# Patient Record
Sex: FEMALE | Race: WHITE | NOT HISPANIC OR LATINO | URBAN - METROPOLITAN AREA
[De-identification: names, ages, dates, MRNs, and addresses within clinical notes are randomized per-mention and may not be internally consistent; named-entity substitution may affect disease eponyms.]

---

## 2023-08-03 ENCOUNTER — HOSPITAL ENCOUNTER (OUTPATIENT)
Dept: DATA CONVERSION | Facility: HOSPITAL | Age: 43
End: 2023-08-03

## 2023-09-07 ENCOUNTER — HOSPITAL ENCOUNTER (OUTPATIENT)
Dept: DATA CONVERSION | Facility: HOSPITAL | Age: 43
Discharge: HOME | End: 2023-09-07
Payer: COMMERCIAL

## 2023-09-07 DIAGNOSIS — R20.2 PARESTHESIA OF SKIN: ICD-10-CM

## 2023-09-07 LAB
ALBUMIN SERPL ELPH-MCNC: NORMAL G/DL
ALBUMIN SERPL-MCNC: 4.5 GM/DL (ref 3.5–5)
ALBUMIN/GLOB SERPL: 1.6 RATIO (ref 1.5–3)
ALP BLD-CCNC: 46 U/L (ref 35–125)
ALPHA1 GLOB SERPL ELPH-MCNC: NORMAL G/DL
ALPHA2 GLOB SERPL ELPH-MCNC: NORMAL G/DL
ALT SERPL-CCNC: 9 U/L (ref 5–40)
ANION GAP SERPL CALCULATED.3IONS-SCNC: 13 MMOL/L (ref 0–19)
AST SERPL-CCNC: 12 U/L (ref 5–40)
B-GLOBULIN SERPL ELPH-MCNC: NORMAL G/DL
BASOPHILS # BLD AUTO: 0.03 K/UL (ref 0–0.22)
BASOPHILS NFR BLD AUTO: 0.7 % (ref 0–1)
BILIRUB SERPL-MCNC: 0.7 MG/DL (ref 0.1–1.2)
BUN SERPL-MCNC: 13 MG/DL (ref 8–25)
BUN/CREAT SERPL: 21.7 RATIO (ref 8–21)
CALCIUM SERPL-MCNC: 9.3 MG/DL (ref 8.5–10.4)
CHLORIDE SERPL-SCNC: 102 MMOL/L (ref 97–107)
CO2 SERPL-SCNC: 24 MMOL/L (ref 24–31)
CREAT SERPL-MCNC: 0.6 MG/DL (ref 0.4–1.6)
DEPRECATED RDW RBC AUTO: 43.7 FL (ref 37–54)
DIFFERENTIAL METHOD BLD: ABNORMAL
EOSINOPHIL # BLD AUTO: 0.03 K/UL (ref 0–0.45)
EOSINOPHIL NFR BLD: 0.7 % (ref 0–3)
ERYTHROCYTE [DISTWIDTH] IN BLOOD BY AUTOMATED COUNT: 12.8 % (ref 11.7–15)
ERYTHROCYTE [SEDIMENTATION RATE] IN BLOOD BY WESTERGREN METHOD: 7 MM/HR (ref 0–20)
GAMMA GLOB SERPL ELPH-MCNC: NORMAL G/DL
GFR SERPL CREATININE-BSD FRML MDRD: 115 ML/MIN/1.73 M2
GLOBULIN SER-MCNC: 2.8 G/DL (ref 1.9–3.7)
GLUCOSE SERPL-MCNC: 88 MG/DL (ref 65–99)
HBA1C MFR BLD: 5.4 % (ref 4–6)
HCT VFR BLD AUTO: 37.2 % (ref 36–44)
HGB BLD-MCNC: 12.2 GM/DL (ref 12–15)
IMM GRANULOCYTES # BLD AUTO: 0.01 K/UL (ref 0–0.1)
LYMPHOCYTES # BLD AUTO: 1.4 K/UL (ref 1.2–3.2)
LYMPHOCYTES NFR BLD MANUAL: 30.9 % (ref 20–40)
MCH RBC QN AUTO: 30.7 PG (ref 26–34)
MCHC RBC AUTO-ENTMCNC: 32.8 % (ref 31–37)
MCV RBC AUTO: 93.5 FL (ref 80–100)
MONOCYTES # BLD AUTO: 0.39 K/UL (ref 0–0.8)
MONOCYTES NFR BLD MANUAL: 8.6 % (ref 0–8)
NEUTROPHILS # BLD AUTO: 2.67 K/UL
NEUTROPHILS # BLD AUTO: 2.67 K/UL (ref 1.8–7.7)
NEUTROPHILS.IMMATURE NFR BLD: 0.2 % (ref 0–1)
NEUTS SEG NFR BLD: 58.9 % (ref 50–70)
NRBC BLD-RTO: 0 /100 WBC
PLATELET # BLD AUTO: 233 K/UL (ref 150–450)
PMV BLD AUTO: 10 CU (ref 7–12.6)
POTASSIUM SERPL-SCNC: 4 MMOL/L (ref 3.4–5.1)
PROT PATTERN SERPL ELPH-IMP: NORMAL
PROT SERPL-MCNC: 7.3 G/DL (ref 5.9–7.9)
PROT SERPL-MCNC: NORMAL G/DL
RBC # BLD AUTO: 3.98 M/UL (ref 4–4.9)
REF LAB TEST NAME: NORMAL
REF LAB TEST RESULTS: NORMAL
SODIUM SERPL-SCNC: 139 MMOL/L (ref 133–145)
TSH SERPL DL<=0.05 MIU/L-ACNC: 2.12 MIU/L (ref 0.27–4.2)
VIT B12 SERPL-MCNC: 546 PG/ML (ref 211–946)
WBC # BLD AUTO: 4.5 K/UL (ref 4.5–11)

## 2023-09-11 LAB
RPR SER QL: NONREACTIVE
SPE STAFF REVIEW: NORMAL
VIT B6 SERPL-MCNC: NORMAL NG/ML

## 2023-09-20 PROBLEM — N92.1 MENOMETRORRHAGIA: Status: ACTIVE | Noted: 2023-09-20

## 2023-09-20 PROBLEM — D72.819 LEUKOPENIA: Status: ACTIVE | Noted: 2023-09-20

## 2023-09-20 PROBLEM — N97.9 FEMALE INFERTILITY: Status: ACTIVE | Noted: 2023-09-20

## 2023-09-20 PROBLEM — M06.4 INFLAMMATORY POLYARTHROPATHY (MULTI): Status: ACTIVE | Noted: 2023-09-20

## 2023-09-20 PROBLEM — M22.8X2 MALTRACKING OF LEFT PATELLA: Status: ACTIVE | Noted: 2023-09-20

## 2023-09-20 RX ORDER — ESTRADIOL 0.1 MG/G
CREAM VAGINAL
COMMUNITY
Start: 2023-02-19

## 2023-09-20 RX ORDER — CLOMIPHENE CITRATE 50 MG/1
2 TABLET ORAL DAILY
COMMUNITY
Start: 2017-08-21

## 2023-09-20 RX ORDER — CHORIONIC GONADOTROPIN 10000 UNIT
KIT INTRAMUSCULAR
COMMUNITY
Start: 2017-08-21

## 2023-09-20 RX ORDER — PROGESTERONE 100 MG/1
100 CAPSULE ORAL NIGHTLY
COMMUNITY
Start: 2023-05-19

## 2023-09-20 RX ORDER — OXYCODONE HYDROCHLORIDE 5 MG/1
5 TABLET ORAL EVERY 6 HOURS PRN
COMMUNITY
Start: 2023-08-07

## 2023-09-20 RX ORDER — DOCUSATE SODIUM 100 MG/1
CAPSULE, LIQUID FILLED ORAL
COMMUNITY
Start: 2023-08-07

## 2023-09-20 RX ORDER — ACETAMINOPHEN 500 MG
1000 TABLET ORAL EVERY 6 HOURS PRN
COMMUNITY

## 2023-09-20 RX ORDER — MISOPROSTOL 100 UG/1
TABLET ORAL
COMMUNITY
Start: 2023-03-14

## 2023-09-20 RX ORDER — CLOBETASOL PROPIONATE 0.5 MG/G
OINTMENT TOPICAL 2 TIMES DAILY
COMMUNITY
Start: 2023-07-19

## 2023-09-20 RX ORDER — FERROUS SULFATE 325(65) MG
65 TABLET, DELAYED RELEASE (ENTERIC COATED) ORAL DAILY
COMMUNITY
Start: 2023-05-25

## 2023-09-20 RX ORDER — ONDANSETRON 4 MG/1
4 TABLET, FILM COATED ORAL EVERY 8 HOURS PRN
COMMUNITY
Start: 2023-08-07

## 2023-09-28 ENCOUNTER — HOSPITAL ENCOUNTER (OUTPATIENT)
Dept: DATA CONVERSION | Facility: HOSPITAL | Age: 43
Discharge: HOME | End: 2023-09-28
Payer: COMMERCIAL

## 2023-09-28 DIAGNOSIS — R20.2 PARESTHESIA OF SKIN: ICD-10-CM

## 2023-10-16 ENCOUNTER — APPOINTMENT (OUTPATIENT)
Dept: PRIMARY CARE | Facility: CLINIC | Age: 43
End: 2023-10-16
Payer: COMMERCIAL

## 2023-10-19 ENCOUNTER — APPOINTMENT (OUTPATIENT)
Dept: RHEUMATOLOGY | Facility: CLINIC | Age: 43
End: 2023-10-19
Payer: COMMERCIAL

## 2024-02-05 ENCOUNTER — TELEPHONE (OUTPATIENT)
Dept: PRIMARY CARE | Facility: CLINIC | Age: 44
End: 2024-02-05

## 2024-02-05 DIAGNOSIS — K42.9 UMBILICAL HERNIA WITHOUT OBSTRUCTION AND WITHOUT GANGRENE: Primary | ICD-10-CM

## 2024-02-05 NOTE — TELEPHONE ENCOUNTER
PT states she was in a while ago, and believes she was told to see a GI doctor.  PT wanting referral for GI.  Please advise.    PT states she had a CT of stomach and has a hernia and it is really bothering her now.

## 2024-06-25 ENCOUNTER — LAB REQUISITION (OUTPATIENT)
Dept: LAB | Facility: HOSPITAL | Age: 44
End: 2024-06-25
Payer: COMMERCIAL

## 2024-06-25 DIAGNOSIS — M79.672 PAIN IN LEFT FOOT: ICD-10-CM

## 2024-06-25 DIAGNOSIS — G57.62 LESION OF PLANTAR NERVE, LEFT LOWER LIMB: ICD-10-CM

## 2024-06-25 PROCEDURE — 87075 CULTR BACTERIA EXCEPT BLOOD: CPT

## 2024-06-25 PROCEDURE — 87070 CULTURE OTHR SPECIMN AEROBIC: CPT

## 2024-06-25 PROCEDURE — 87186 SC STD MICRODIL/AGAR DIL: CPT

## 2024-06-25 PROCEDURE — 87205 SMEAR GRAM STAIN: CPT

## 2024-06-28 LAB
B-LACTAMASE ORGANISM ISLT: POSITIVE
BACTERIA SPEC CULT: ABNORMAL
GRAM STN SPEC: ABNORMAL
GRAM STN SPEC: ABNORMAL

## 2024-09-13 ENCOUNTER — OFFICE VISIT (OUTPATIENT)
Dept: PRIMARY CARE | Facility: CLINIC | Age: 44
End: 2024-09-13
Payer: COMMERCIAL

## 2024-09-13 VITALS
DIASTOLIC BLOOD PRESSURE: 60 MMHG | HEART RATE: 100 BPM | SYSTOLIC BLOOD PRESSURE: 114 MMHG | TEMPERATURE: 97.1 F | BODY MASS INDEX: 20.18 KG/M2 | OXYGEN SATURATION: 97 % | HEIGHT: 66 IN

## 2024-09-13 DIAGNOSIS — Z00.00 ADULT GENERAL MEDICAL EXAM: Primary | ICD-10-CM

## 2024-09-13 DIAGNOSIS — Z12.31 BREAST CANCER SCREENING BY MAMMOGRAM: ICD-10-CM

## 2024-09-13 DIAGNOSIS — E55.9 VITAMIN D DEFICIENCY: ICD-10-CM

## 2024-09-13 DIAGNOSIS — R39.9 UTI SYMPTOMS: ICD-10-CM

## 2024-09-13 PROBLEM — M06.4 INFLAMMATORY POLYARTHROPATHY (MULTI): Status: RESOLVED | Noted: 2023-09-20 | Resolved: 2024-09-13

## 2024-09-13 LAB
POC APPEARANCE, URINE: CLEAR
POC BILIRUBIN, URINE: NEGATIVE
POC BLOOD, URINE: NEGATIVE
POC COLOR, URINE: YELLOW
POC GLUCOSE, URINE: NEGATIVE MG/DL
POC KETONES, URINE: NEGATIVE MG/DL
POC LEUKOCYTES, URINE: NEGATIVE
POC NITRITE,URINE: NEGATIVE
POC PH, URINE: 6 PH
POC PROTEIN, URINE: NEGATIVE MG/DL
POC SPECIFIC GRAVITY, URINE: >=1.03
POC UROBILINOGEN, URINE: 0.2 EU/DL

## 2024-09-13 PROCEDURE — 99396 PREV VISIT EST AGE 40-64: CPT | Performed by: INTERNAL MEDICINE

## 2024-09-13 PROCEDURE — 1036F TOBACCO NON-USER: CPT | Performed by: INTERNAL MEDICINE

## 2024-09-13 PROCEDURE — 81003 URINALYSIS AUTO W/O SCOPE: CPT | Performed by: INTERNAL MEDICINE

## 2024-09-13 ASSESSMENT — PAIN SCALES - GENERAL: PAINLEVEL: 0-NO PAIN

## 2024-09-13 ASSESSMENT — PATIENT HEALTH QUESTIONNAIRE - PHQ9
SUM OF ALL RESPONSES TO PHQ9 QUESTIONS 1 AND 2: 0
2. FEELING DOWN, DEPRESSED OR HOPELESS: NOT AT ALL
1. LITTLE INTEREST OR PLEASURE IN DOING THINGS: NOT AT ALL

## 2024-09-13 NOTE — PROGRESS NOTES
"Subjective   Patient ID: Fredrick Adamson is a 43 y.o. female who presents for Annual Exam.    HPI  1.  Physical exam.  Pt has to hold her urine a lot at work and is c/o burning when she finally urinates.  Pap: last done 12/2019  Mammogram: last done 1/2024     Review of Systems  All pertinent positive symptoms are included in the history of present illness.    All other systems have been reviewed and are negative and noncontributory to this patient's current ailments.    Past Medical History:   Diagnosis Date    Other specified postprocedural states     History of in vitro fertilization     Past Surgical History:   Procedure Laterality Date    OTHER SURGICAL HISTORY  08/01/2017    Gynec[facility] Services In Vitro Fertilization     Social History     Tobacco Use    Smoking status: Never     Passive exposure: Never    Smokeless tobacco: Never   Substance Use Topics    Alcohol use: Never    Drug use: Never     Family History   Problem Relation Name Age of Onset    [place] disease Father       No Known Allergies  Immunization History   Administered Date(s) Administered    MMR vaccine, subcutaneous (MMR II) 04/22/2005    Pfizer Purple Cap SARS-CoV-2 05/16/2021, 06/06/2021, 12/08/2021    Td (adult), unspecified 04/22/2005    Tdap vaccine, age 7 year and older (BOOSTRIX, ADACEL) 04/03/2012, 09/22/2018, 03/24/2021     Current Outpatient Medications   Medication Instructions    clobetasol (Temovate) 0.05 % ointment Topical, 2 times daily, APPLY TO VULVA AREA 2X WEEKLY AS DIRECTED     Objective   Visit Vitals  /60   Pulse 100   Temp 36.2 °C (97.1 °F)   Ht 1.676 m (5' 6\") Comment: stated   LMP  (LMP Unknown) Comment: ablaision   SpO2 97%   BMI 20.18 kg/m²   Smoking Status Never   BSA 1.62 m²     Office Visit on 09/13/2024   Component Date Value    POC Color, Urine 09/13/2024 Yellow     POC Appearance, Urine 09/13/2024 Clear     POC Glucose, Urine 09/13/2024 NEGATIVE     POC Bilirubin, Urine 09/13/2024 NEGATIVE     POC " Ketones, Urine 09/13/2024 NEGATIVE     POC Specific Gravity, Ur* 09/13/2024 >=1.030     POC Blood, Urine 09/13/2024 NEGATIVE     POC PH, Urine 09/13/2024 6.0     POC Protein, Urine 09/13/2024 NEGATIVE     POC Urobilinogen, Urine 09/13/2024 0.2     Poc Nitrite, Urine 09/13/2024 NEGATIVE     POC Leukocytes, Urine 09/13/2024 NEGATIVE      Physical Exam  CONSTITUTIONAL - well nourished, well developed, looks like stated age, in no acute distress, not ill-appearing, and not tired appearing  SKIN - normal skin color and pigmentation, normal skin turgor without rash, lesions, or nodules visualized  HEAD - no trauma, normocephalic  EYES - pupils are equal and reactive to light, extraocular muscles are intact, and normal external exam  ENT - TM's intact, no injection, no signs of infection, uvula midline, normal tongue movement and throat normal, no exudate, nasal passage without discharge and patent  NECK - supple without rigidity, no neck mass was observed, no thyromegaly or thyroid nodules  CHEST - clear to auscultation, no wheezing, no crackles and no rales, good effort  CARDIAC - regular rate and regular rhythm, no skipped beats, no murmur  ABDOMEN - no organomegaly, soft, nontender, nondistended, normal bowel sounds, no guarding/rebound/rigidity, negative McBurney sign and negative Briceño sign  EXTREMITIES - no edema, no deformities  NEUROLOGICAL - normal gait, normal balance, normal motor, no ataxia; alert, oriented and no focal signs  PSYCHIATRIC - alert, pleasant and cordial, age-appropriate  IMMUNOLOGIC - no cervical lymphadenopathy    Assessment/Plan   Problem List Items Addressed This Visit    None  Visit Diagnoses       Adult general medical exam    -  Primary    Relevant Orders    Lipid Panel    Comprehensive Metabolic Panel    CBC    UTI symptoms        Relevant Orders    POCT UA Automated manually resulted (Completed)    Breast cancer screening by mammogram        Relevant Orders    BI mammo bilateral  screening tomosynthesis    Vitamin D deficiency        Relevant Orders    Vitamin D 25-Hydroxy,Total (for eval of Vitamin D levels)

## 2024-10-09 ENCOUNTER — EVALUATION (OUTPATIENT)
Dept: PHYSICAL THERAPY | Facility: CLINIC | Age: 44
End: 2024-10-09
Payer: COMMERCIAL

## 2024-10-09 DIAGNOSIS — G57.63 MORTON'S NEUROMA OF BOTH FEET: Primary | ICD-10-CM

## 2024-10-09 DIAGNOSIS — R26.2 DIFFICULTY IN WALKING INVOLVING ANKLE AND FOOT JOINT: ICD-10-CM

## 2024-10-09 PROCEDURE — 97140 MANUAL THERAPY 1/> REGIONS: CPT | Mod: GP | Performed by: PHYSICAL THERAPIST

## 2024-10-09 PROCEDURE — 97161 PT EVAL LOW COMPLEX 20 MIN: CPT | Mod: GP | Performed by: PHYSICAL THERAPIST

## 2024-10-09 ASSESSMENT — PAIN - FUNCTIONAL ASSESSMENT: PAIN_FUNCTIONAL_ASSESSMENT: 0-10

## 2024-10-09 ASSESSMENT — PAIN SCALES - GENERAL: PAINLEVEL_OUTOF10: 4

## 2024-10-09 NOTE — PROGRESS NOTES
"Physical Therapy  Physical Therapy Orthopedic Evaluation    Patient Name: Fredrick Adamson  MRN: [id number]  Today's Date: 10/9/2024    Insurance:  Payor: [facility] / Plan: [facility] / Product Type: *No Product type* /   Number of Treatments Authorized: 1/MN  Certification Period Start Date: 10/09/24  Certification Period End Date: 01/07/25    Current Problem  Problem List Items Addressed This Visit             ICD-10-CM    Mata's neuroma of both feet - Primary G57.63    Difficulty in walking involving ankle and foot joint R26.2       General:  General  Reason for Referral: Bilateral foot pain -  s/p neuroma excision  Referred By: Dr. Jarod Wern DPM  Past Medical History Relevant to Rehab: Neuroma excision 5/31/24  General Comment: Bilateral foot pain at dorsum and plantar surface of feet.Excision of neuroma b/l feet. DOS 5-31-24. Patient has had 10 physical therapy sessions at another provider and has made progress, but is still having pain and difficulty moving some toes - especially 4th toe on L foot. Concerned that these will not heal and she will have to live with the pain.  Pain on both dorsum and plantar surface of each foot. Sensation of \"zinging and like a bruise.\"     Precautions:    None    Medical History Form: Reviewed (scanned into chart)    Subjective:   Subjective       Pain:  Pain Assessment: 0-10  0-10 (Numeric) Pain Score: 4 (Varies - worse when barefoot)    Relevant Information (PMH & Previous Tests/Imaging): none  Previous Interventions/Treatments: Physical Therapy    Prior Level of Function (PLOF)  Prior Function Per Pt/Caregiver Report  Level of [place]: Independent with ADLs and functional transfers, Independent with homemaking with ambulation  Patient previously independent with all ADLs    Patients Living Environment:   Home Living Comment: Lives at home with family in multi-level home - working full time as a teacher.    Primary Language: " English    Red Flags: Do you have any of the following? No  Fever/chills, unexplained weight changes, dizziness/fainting, unexplained change in bowel or bladder functions, unexplained malaise or muscle weakness, night pain/sweats, numbness or tingling    Objective   Poor toe extension of L 4th digit.   All toe flexion and extension painful.  Poor movement of 1-2 ray of feet - L >R.   ANKLE  Ankle AROM  Ankle AROM WFL: yes  Ankle PROM     Ankle MMT  Ankle MMT WFL: yes  Special Tests  Poor scar tissue mobility bilaterally - L >R.   Outcome Measures:    Other Measures  Lower Extremity Funtional Score (LEFS): 64/80    EDUCATION: home exercise program, plan of care, activity modifications, pain management, and injury pathology  Outpatient Education  Patient/Caregiver Demonstrated Understanding: yes  Plan of Care Discussed and Agreed Upon: yes  Patient Response to Education: Patient/Caregiver Verbalized Understanding of Information    Assessment:  PT Assessment Results: Decreased range of motion, Impaired sensation, Pain  Rehab Prognosis: Good  Assessment Comment: Patient arrives with complaint of bilateral foot pain due to neuroma excision. Edu re: slow healing of nerves - especially at feet due to WB requirements. Edu re: scar tissue massage and use of Mederma to soften and reduce scar tissue. Patient with less pain performing toe yoga exercises and walking following manual therapy. Will benefit from therapy to address scar tissue, desensitization and exercise towards goals.    Clinical Presentation: Stable and/or uncomplicated characteristics  Personal Factors: None    Plan:  Treatment/Interventions: Dry needling, Manual therapy, Neuromuscular re-education, Self care/ home management, Taping techniques, Therapeutic activities, Therapeutic exercises  PT Plan: Skilled PT  PT Frequency:  (1-2 x a week)  Duration: 10 visits  Onset Date: 05/31/24  Certification Period Start Date: 10/09/24  Certification Period End Date:  "25  Number of Treatments Authorized: 1/MN  Rehab Potential: Good      Goals: Set and discussed today  Active       PT Problem       PT Goal 1       Start:  10/09/24    Expected End:  25       ST) Patient will improve LEFS score by 9 points in order to perform functional activities at home and in the community in 4 weeks.  2) Patient will be able to complete ADLs with pain less than 1/10 in 4 weeks.  3) Pt will improve foot/ankle AROM to equal opposite foot without pain or \"pulling\" to complete ADLs with less difficulty in 4 weeks.   4) Patient will be independent with HEP to allow for continued improvement in daily tasks at home and in the community in 3 visits.   LT) Patient will have strength at intrinsic foot musculature to equal opposite LE to allow pain free barefoot walking at home in 6 weeks.  2) Patient will report sensation changes at feet have resolved in order to stand and walk without pain for work and home duties in 6 weeks.   3) Patient will tolerate walk/jog in clinic for eventual return to fitness activities without pain for health management in 6 weeks.    4) Patient will improve LEFS score >/=70/80 points in order to perform functional activities at home and in the community by discharge.              Plan of care was developed with input and agreement by the patient    Treatments:  Therapeutic Exercise  Therapeutic Exercise Activity 1: Toe yoga - extension in great toe and 2-5 x 10 bilat  Therapeutic Exercise Activity 2: Splay toes x 10 - B  Therapeutic Exercise Activity 3: Toe yoga flexion x 10    Manual Therapy  Manual Therapy Activity 1: Scar tissue massage bilaterally  Manual Therapy Activity 2: DTM/STM of retinaculum, toe extensor group and plantar fascia insert at metatarsal heads bilaterally.  Manual Therapy Activity 3: Mobilization of rays 1,2 bilaterally    Ambulatory Screenings Summary       Screening  Frequency  Date Last Completed   Spiritual and Cultural Beliefs  "  Screening each visit or episode of care 9/13/2024   Falls Risk Screening every ambulatory visit [unfilled]   Pain Screening annually at primary care visit  9/13/2024   Domestic Violence screening annually at primary care visit 9/13/2024   Depression Screening annually in the primary care setting 9/13/2024   Suicide Risk Screening annually in the primary care setting    Nutrition and Food Insecurity   Screening at least annually at primary care visit     Key Learner annually in the primary care setting 9/13/2024   Drug Screen  9/13/2024  3:57 PM   Alcohol Screen  9/13/2024  3:57 PM   Advance Directive  9/13/2024       Time Calculation  Start Time: 0700  Stop Time: 0745  Time Calculation (min): 45 min  PT Evaluation Time Entry  PT Evaluation (Low) Time Entry: 28, PT Therapeutic Procedures Time Entry  Manual Therapy Time Entry: 10  Therapeutic Exercise Time Entry: 4,

## 2024-10-15 ENCOUNTER — TREATMENT (OUTPATIENT)
Dept: PHYSICAL THERAPY | Facility: CLINIC | Age: 44
End: 2024-10-15
Payer: COMMERCIAL

## 2024-10-15 DIAGNOSIS — G57.63 MORTON'S NEUROMA OF BOTH FEET: ICD-10-CM

## 2024-10-15 DIAGNOSIS — R26.2 DIFFICULTY IN WALKING INVOLVING ANKLE AND FOOT JOINT: ICD-10-CM

## 2024-10-15 PROCEDURE — 97140 MANUAL THERAPY 1/> REGIONS: CPT | Mod: GP | Performed by: PHYSICAL THERAPIST

## 2024-10-15 PROCEDURE — 97110 THERAPEUTIC EXERCISES: CPT | Mod: GP | Performed by: PHYSICAL THERAPIST

## 2024-10-15 ASSESSMENT — PAIN - FUNCTIONAL ASSESSMENT: PAIN_FUNCTIONAL_ASSESSMENT: 0-10

## 2024-10-15 ASSESSMENT — PAIN SCALES - GENERAL: PAINLEVEL_OUTOF10: 4

## 2024-10-15 NOTE — PROGRESS NOTES
Physical Therapy Treatment    Patient Name: Fredrick Adamson  MRN: [id number]  Today's Date: 10/15/2024    Current Problem  Problem List Items Addressed This Visit             ICD-10-CM    Mata's neuroma of both feet G57.63    Difficulty in walking involving ankle and foot joint R26.2       Insurance:  Payor: [facility] / Plan: [facility] / Product Type: *No Product type* /   Number of Treatments Authorized: 2/MN  Certification Period Start Date: 10/09/24  Certification Period End Date: 01/07/25    Subjective   General  Reason for Referral: Bilateral foot pain -  s/p neuroma excision  Referred By: Dr. Jarod Wren DPM  Past Medical History Relevant to Rehab: Neuroma excision 5/31/24  General Comment: Patient states that she felt the manual helped last session. Continues to have a difficult time with flexion on her L especially her 4th toe. Pain remains with standing and feeling of compression on her feet.    Performing HEP?: Yes    Precautions     Pain  Pain Assessment: 0-10  0-10 (Numeric) Pain Score: 4    Objective     General Observation  General Observation: Reduced 4th MTP flexion on L    Treatments:    Therapeutic Exercise  Therapeutic Exercise Performed: Yes  Therapeutic Exercise Activity 1: Great toe flexion, extension and adduction red TB x 15 ea ft    Balance/Neuromuscular Re-Education  Balance/Neuromuscular Re-Education Activity Performed: Yes  Balance/Neuromuscular Re-Education Activity 1: Mobo 2/4 posts tilts 2 x 15    Manual Therapy  Manual Therapy Activity 1: Scar tissue massage bilaterally  Manual Therapy Activity 2: IASTM to incisions, anterior tibial and anterior retinaculum B  Manual Therapy Activity 3: Mobilization and distraction of 3rd and 4th MTP        Assessment:  PT Assessment  PT Assessment Results: Decreased range of motion, Impaired sensation, Pain  Assessment Comment: Patient was slight improvement in mobility following IASTM and scar mobilization.  Greater  "scarring mobility on the left compared to the right though tender bilaterally on plantar and dorsal surfaces.  Will continue to focus on scar mobility and joint mobilization though discussed with patient importance of desensitization to bilateral lower extremity to allow for reduced neural pain with pressure and texture.    Plan:  OP PT Plan  Treatment/Interventions: Dry needling, Manual therapy, Neuromuscular re-education, Self care/ home management, Taping techniques, Therapeutic activities, Therapeutic exercises  PT Plan: Skilled PT  PT Frequency:  (1-2 x a week)  Duration: 10 visits  Onset Date: 24  Certification Period Start Date: 10/09/24  Certification Period End Date: 25  Number of Treatments Authorized: 2/MN  Rehab Potential: Good    Goals:  Active       PT Problem       PT Goal 1       Start:  10/09/24    Expected End:  25       ST) Patient will improve LEFS score by 9 points in order to perform functional activities at home and in the community in 4 weeks.  2) Patient will be able to complete ADLs with pain less than 1/10 in 4 weeks.  3) Pt will improve foot/ankle AROM to equal opposite foot without pain or \"pulling\" to complete ADLs with less difficulty in 4 weeks.   4) Patient will be independent with HEP to allow for continued improvement in daily tasks at home and in the community in 3 visits.   LT) Patient will have strength at intrinsic foot musculature to equal opposite LE to allow pain free barefoot walking at home in 6 weeks.  2) Patient will report sensation changes at feet have resolved in order to stand and walk without pain for work and home duties in 6 weeks.   3) Patient will tolerate walk/jog in clinic for eventual return to fitness activities without pain for health management in 6 weeks.    4) Patient will improve LEFS score >/=70/80 points in order to perform functional activities at home and in the community by discharge.               Time " Calculation  Start Time: 1615  Stop Time: 1655  Time Calculation (min): 40 min  PT Therapeutic Procedures Time Entry  Manual Therapy Time Entry: 22  Neuromuscular Re-Education Time Entry: 4  Therapeutic Exercise Time Entry: 12,

## 2024-10-16 ENCOUNTER — LAB (OUTPATIENT)
Dept: LAB | Facility: LAB | Age: 44
End: 2024-10-16
Payer: COMMERCIAL

## 2024-10-16 DIAGNOSIS — Z00.00 ADULT GENERAL MEDICAL EXAM: ICD-10-CM

## 2024-10-16 DIAGNOSIS — E55.9 VITAMIN D DEFICIENCY: ICD-10-CM

## 2024-10-16 LAB
25(OH)D3 SERPL-MCNC: 33 NG/ML (ref 30–100)
ALBUMIN SERPL BCP-MCNC: 4.5 G/DL (ref 3.4–5)
ALP SERPL-CCNC: 37 U/L (ref 33–110)
ALT SERPL W P-5'-P-CCNC: 7 U/L (ref 7–45)
ANION GAP SERPL CALC-SCNC: 12 MMOL/L (ref 10–20)
AST SERPL W P-5'-P-CCNC: 10 U/L (ref 9–39)
BILIRUB SERPL-MCNC: 0.8 MG/DL (ref 0–1.2)
BUN SERPL-MCNC: 13 MG/DL (ref 6–23)
CALCIUM SERPL-MCNC: 9.4 MG/DL (ref 8.6–10.6)
CHLORIDE SERPL-SCNC: 106 MMOL/L (ref 98–107)
CHOLEST SERPL-MCNC: 192 MG/DL (ref 0–199)
CHOLESTEROL/HDL RATIO: 2.8
CO2 SERPL-SCNC: 26 MMOL/L (ref 21–32)
CREAT SERPL-MCNC: 0.67 MG/DL (ref 0.5–1.05)
EGFRCR SERPLBLD CKD-EPI 2021: >90 ML/MIN/1.73M*2
ERYTHROCYTE [DISTWIDTH] IN BLOOD BY AUTOMATED COUNT: 11.9 % (ref 11.5–14.5)
GLUCOSE SERPL-MCNC: 95 MG/DL (ref 74–99)
HCT VFR BLD AUTO: 37.6 % (ref 36–46)
HDLC SERPL-MCNC: 68.7 MG/DL
HGB BLD-MCNC: 12.6 G/DL (ref 12–16)
LDLC SERPL CALC-MCNC: 113 MG/DL
MCH RBC QN AUTO: 31.4 PG (ref 26–34)
MCHC RBC AUTO-ENTMCNC: 33.5 G/DL (ref 32–36)
MCV RBC AUTO: 94 FL (ref 80–100)
NON HDL CHOLESTEROL: 123 MG/DL (ref 0–149)
NRBC BLD-RTO: 0 /100 WBCS (ref 0–0)
PLATELET # BLD AUTO: 278 X10*3/UL (ref 150–450)
POTASSIUM SERPL-SCNC: 3.6 MMOL/L (ref 3.5–5.3)
PROT SERPL-MCNC: 7 G/DL (ref 6.4–8.2)
RBC # BLD AUTO: 4.01 X10*6/UL (ref 4–5.2)
SODIUM SERPL-SCNC: 140 MMOL/L (ref 136–145)
TRIGL SERPL-MCNC: 50 MG/DL (ref 0–149)
VLDL: 10 MG/DL (ref 0–40)
WBC # BLD AUTO: 5.2 X10*3/UL (ref 4.4–11.3)

## 2024-10-16 PROCEDURE — 36415 COLL VENOUS BLD VENIPUNCTURE: CPT

## 2024-10-16 PROCEDURE — 80053 COMPREHEN METABOLIC PANEL: CPT

## 2024-10-16 PROCEDURE — 80061 LIPID PANEL: CPT

## 2024-10-16 PROCEDURE — 82306 VITAMIN D 25 HYDROXY: CPT

## 2024-10-16 PROCEDURE — 85027 COMPLETE CBC AUTOMATED: CPT

## 2024-10-17 ENCOUNTER — APPOINTMENT (OUTPATIENT)
Dept: PHYSICAL THERAPY | Facility: CLINIC | Age: 44
End: 2024-10-17
Payer: COMMERCIAL

## 2024-10-19 ENCOUNTER — APPOINTMENT (OUTPATIENT)
Dept: RADIOLOGY | Facility: HOSPITAL | Age: 44
End: 2024-10-19
Payer: COMMERCIAL

## 2024-10-22 ENCOUNTER — HOSPITAL ENCOUNTER (OUTPATIENT)
Dept: RADIOLOGY | Facility: EXTERNAL LOCATION | Age: 44
Discharge: HOME | End: 2024-10-22

## 2024-10-22 ENCOUNTER — TREATMENT (OUTPATIENT)
Dept: PHYSICAL THERAPY | Facility: CLINIC | Age: 44
End: 2024-10-22
Payer: COMMERCIAL

## 2024-10-22 DIAGNOSIS — R26.2 DIFFICULTY IN WALKING INVOLVING ANKLE AND FOOT JOINT: ICD-10-CM

## 2024-10-22 DIAGNOSIS — R92.8 OTHER ABNORMAL AND INCONCLUSIVE FINDINGS ON DIAGNOSTIC IMAGING OF BREAST: ICD-10-CM

## 2024-10-22 DIAGNOSIS — G57.63 MORTON'S NEUROMA OF BOTH FEET: ICD-10-CM

## 2024-10-22 DIAGNOSIS — Z98.890 H/O LEFT BREAST BIOPSY: ICD-10-CM

## 2024-10-22 DIAGNOSIS — R92.8 ABNORMAL MAMMOGRAM OF LEFT BREAST: ICD-10-CM

## 2024-10-22 PROCEDURE — 97112 NEUROMUSCULAR REEDUCATION: CPT | Mod: GP | Performed by: PHYSICAL THERAPIST

## 2024-10-22 PROCEDURE — 97140 MANUAL THERAPY 1/> REGIONS: CPT | Mod: GP | Performed by: PHYSICAL THERAPIST

## 2024-10-22 ASSESSMENT — PAIN - FUNCTIONAL ASSESSMENT: PAIN_FUNCTIONAL_ASSESSMENT: 0-10

## 2024-10-22 ASSESSMENT — PAIN SCALES - GENERAL: PAINLEVEL_OUTOF10: 3

## 2024-10-22 NOTE — PROGRESS NOTES
Physical Therapy Treatment    Patient Name: Fredrick Adamson  MRN: [id number]  Today's Date: 10/22/2024    Current Problem  Problem List Items Addressed This Visit             ICD-10-CM    Mata's neuroma of both feet G57.63    Difficulty in walking involving ankle and foot joint R26.2       Insurance:  Payor: [facility] / Plan: [facility] / Product Type: *No Product type* /   Number of Treatments Authorized: 3/20  Certification Period Start Date: 10/09/24  Certification Period End Date: 01/07/25    Subjective   General  Reason for Referral: Bilateral foot pain -  s/p neuroma excision  Referred By: Dr. Jarod Wren DPM  Past Medical History Relevant to Rehab: Neuroma excision 5/31/24  General Comment: Patient with increased tissue mobility after last session though was sore. Remains tender with a bruising sensation under her foot.    Performing HEP?: Yes    Precautions     Pain  Pain Assessment: 0-10  0-10 (Numeric) Pain Score: 3    Objective     General Observation  General Observation: TTP B incisions    Treatments:    Therapeutic Exercise  Therapeutic Exercise Activity 1: Heel raise DL with 3# ball squeeze in heels x 25    Balance/Neuromuscular Re-Education  Balance/Neuromuscular Re-Education Activity Performed: Yes  Balance/Neuromuscular Re-Education Activity 1: SL RDL on airex 2 x 12 ea    Manual Therapy  Manual Therapy Activity 1: Scar tissue massage bilaterally  Manual Therapy Activity 2: Mobilization and distraction of 3rd and 4th MTP    Assessment:  PT Assessment  PT Assessment Results: Decreased range of motion, Impaired sensation, Pain  Assessment Comment: Patient with improved toe flexion range of motion this session.  Continues to remain tender along plantar surface of third and fourth distal ray.  Improve mobility of dorsal incisions though remains hypersensitive.  Focused on great toe activation for arch support and instructed patient to initiate bilateral heel raises at  "home for gastroc strengthening as well as weightbearing toe extension.    Plan:  OP PT Plan  Treatment/Interventions: Dry needling, Manual therapy, Neuromuscular re-education, Self care/ home management, Taping techniques, Therapeutic activities, Therapeutic exercises  PT Plan: Skilled PT  PT Frequency:  (1-2 x a week)  Duration: 10 visits  Onset Date: 24  Certification Period Start Date: 10/09/24  Certification Period End Date: 25  Number of Treatments Authorized: 3/20  Rehab Potential: Good    Goals:  Active       PT Problem       PT Goal 1       Start:  10/09/24    Expected End:  25       ST) Patient will improve LEFS score by 9 points in order to perform functional activities at home and in the community in 4 weeks.  2) Patient will be able to complete ADLs with pain less than 1/10 in 4 weeks.  3) Pt will improve foot/ankle AROM to equal opposite foot without pain or \"pulling\" to complete ADLs with less difficulty in 4 weeks.   4) Patient will be independent with HEP to allow for continued improvement in daily tasks at home and in the community in 3 visits.   LT) Patient will have strength at intrinsic foot musculature to equal opposite LE to allow pain free barefoot walking at home in 6 weeks.  2) Patient will report sensation changes at feet have resolved in order to stand and walk without pain for work and home duties in 6 weeks.   3) Patient will tolerate walk/jog in clinic for eventual return to fitness activities without pain for health management in 6 weeks.    4) Patient will improve LEFS score >/=70/80 points in order to perform functional activities at home and in the community by discharge.               Time Calculation  Start Time:   Stop Time:   Time Calculation (min): 40 min  PT Therapeutic Procedures Time Entry  Manual Therapy Time Entry: 28  Neuromuscular Re-Education Time Entry: 7  Therapeutic Exercise Time Entry: 3,    "

## 2024-10-23 ENCOUNTER — HOSPITAL ENCOUNTER (OUTPATIENT)
Dept: RADIOLOGY | Facility: HOSPITAL | Age: 44
Discharge: HOME | End: 2024-10-23
Payer: COMMERCIAL

## 2024-10-23 VITALS — HEIGHT: 66 IN | WEIGHT: 125 LBS | BODY MASS INDEX: 20.09 KG/M2

## 2024-10-23 DIAGNOSIS — Z12.31 BREAST CANCER SCREENING BY MAMMOGRAM: ICD-10-CM

## 2024-10-23 PROCEDURE — 77067 SCR MAMMO BI INCL CAD: CPT

## 2024-10-30 ENCOUNTER — TREATMENT (OUTPATIENT)
Dept: PHYSICAL THERAPY | Facility: CLINIC | Age: 44
End: 2024-10-30
Payer: COMMERCIAL

## 2024-10-30 ENCOUNTER — APPOINTMENT (OUTPATIENT)
Dept: PHYSICAL THERAPY | Facility: CLINIC | Age: 44
End: 2024-10-30
Payer: COMMERCIAL

## 2024-10-30 DIAGNOSIS — R26.2 DIFFICULTY IN WALKING INVOLVING ANKLE AND FOOT JOINT: ICD-10-CM

## 2024-10-30 DIAGNOSIS — G57.63 MORTON'S NEUROMA OF BOTH FEET: ICD-10-CM

## 2024-10-30 PROCEDURE — 97140 MANUAL THERAPY 1/> REGIONS: CPT | Mod: GP | Performed by: PHYSICAL THERAPIST

## 2024-10-30 PROCEDURE — 97110 THERAPEUTIC EXERCISES: CPT | Mod: GP | Performed by: PHYSICAL THERAPIST

## 2024-10-30 ASSESSMENT — PAIN - FUNCTIONAL ASSESSMENT: PAIN_FUNCTIONAL_ASSESSMENT: 0-10

## 2024-10-30 ASSESSMENT — PAIN SCALES - GENERAL: PAINLEVEL_OUTOF10: 3

## 2024-11-05 ENCOUNTER — TREATMENT (OUTPATIENT)
Dept: PHYSICAL THERAPY | Facility: CLINIC | Age: 44
End: 2024-11-05
Payer: COMMERCIAL

## 2024-11-05 DIAGNOSIS — G57.63 MORTON'S NEUROMA OF BOTH FEET: ICD-10-CM

## 2024-11-05 DIAGNOSIS — R26.2 DIFFICULTY IN WALKING INVOLVING ANKLE AND FOOT JOINT: ICD-10-CM

## 2024-11-05 PROCEDURE — 97140 MANUAL THERAPY 1/> REGIONS: CPT | Mod: GP | Performed by: PHYSICAL THERAPIST

## 2024-11-05 ASSESSMENT — PAIN - FUNCTIONAL ASSESSMENT: PAIN_FUNCTIONAL_ASSESSMENT: 0-10

## 2024-11-05 ASSESSMENT — PAIN SCALES - GENERAL: PAINLEVEL_OUTOF10: 2

## 2024-11-05 NOTE — PROGRESS NOTES
Physical Therapy Treatment    Patient Name: Fredrick Adamson  MRN: [id number]  Today's Date: 11/5/2024    Current Problem  Problem List Items Addressed This Visit             ICD-10-CM    Mata's neuroma of both feet G57.63    Difficulty in walking involving ankle and foot joint R26.2       Insurance:  Payor: [facility] / Plan: [facility] / Product Type: *No Product type* /   Number of Treatments Authorized: 5/20  Certification Period Start Date: 10/09/24  Certification Period End Date: 01/07/25    Subjective   General  Reason for Referral: Bilateral foot pain -  s/p neuroma excision  Referred By: Dr. Jarod Wren DPM  Past Medical History Relevant to Rehab: Neuroma excision 5/31/24  General Comment: Patient states that her L foot still feels good, notes some soreness last night along her arch. also had more proximal pain on her R along the bottom of her foot.    Performing HEP?: No    Precautions     Pain  Pain Assessment: 0-10  0-10 (Numeric) Pain Score: 2  Pain Type: Surgical pain  Pain Location: Foot  Pain Orientation: Right    Objective     General Observation  General Observation: TTP B scars    Treatments:      Manual Therapy  Manual Therapy Activity 1: Scar tissue massage B  Manual Therapy Activity 2: Mobilization and distraction of 3rd and 4th MTP B, Shucking of B MTP  Manual Therapy Activity 3: IASTM incision and plantar foot on B    Assessment:  PT Assessment  Assessment Comment: Increased manual performed the session to allow for greater scar mobility and distal MTP mobility.  Patient with reduced pain with ambulation though continues to have mild tenderness along incision and plantar surface of bilateral feet.  Will continue to focus on manual therapy to allow for greater freedom of movement and ability to except weight with minimal increase in pain and transition out of shoes.    Plan:  OP PT Plan  Treatment/Interventions: Dry needling, Manual therapy, Neuromuscular  "re-education, Self care/ home management, Taping techniques, Therapeutic activities, Therapeutic exercises  PT Plan: Skilled PT  PT Frequency:  (1-2 x a week)  Duration: 10 visits  Onset Date: 24  Certification Period Start Date: 10/09/24  Certification Period End Date: 25  Number of Treatments Authorized:   Rehab Potential: Good    Goals:  Active       PT Problem       PT Goal 1       Start:  10/09/24    Expected End:  25       ST) Patient will improve LEFS score by 9 points in order to perform functional activities at home and in the community in 4 weeks.  2) Patient will be able to complete ADLs with pain less than 1/10 in 4 weeks.  3) Pt will improve foot/ankle AROM to equal opposite foot without pain or \"pulling\" to complete ADLs with less difficulty in 4 weeks.   4) Patient will be independent with HEP to allow for continued improvement in daily tasks at home and in the community in 3 visits.   LT) Patient will have strength at intrinsic foot musculature to equal opposite LE to allow pain free barefoot walking at home in 6 weeks.  2) Patient will report sensation changes at feet have resolved in order to stand and walk without pain for work and home duties in 6 weeks.   3) Patient will tolerate walk/jog in clinic for eventual return to fitness activities without pain for health management in 6 weeks.    4) Patient will improve LEFS score >/=70/80 points in order to perform functional activities at home and in the community by discharge.               Time Calculation  Start Time: 1603  Stop Time: 1645  Time Calculation (min): 42 min  PT Therapeutic Procedures Time Entry  Manual Therapy Time Entry: 40,    "

## 2024-11-07 ENCOUNTER — APPOINTMENT (OUTPATIENT)
Dept: PHYSICAL THERAPY | Facility: CLINIC | Age: 44
End: 2024-11-07
Payer: COMMERCIAL

## 2024-11-11 ENCOUNTER — HOSPITAL ENCOUNTER (OUTPATIENT)
Dept: RADIOLOGY | Facility: CLINIC | Age: 44
Discharge: HOME | End: 2024-11-11
Payer: COMMERCIAL

## 2024-11-11 ENCOUNTER — OFFICE VISIT (OUTPATIENT)
Dept: ORTHOPEDIC SURGERY | Facility: CLINIC | Age: 44
End: 2024-11-11
Payer: COMMERCIAL

## 2024-11-11 DIAGNOSIS — M25.562 LEFT KNEE PAIN, UNSPECIFIED CHRONICITY: ICD-10-CM

## 2024-11-11 DIAGNOSIS — M22.2X2 PATELLOFEMORAL SYNDROME OF LEFT KNEE: ICD-10-CM

## 2024-11-11 PROCEDURE — 73564 X-RAY EXAM KNEE 4 OR MORE: CPT | Mod: LT

## 2024-11-11 PROCEDURE — 99203 OFFICE O/P NEW LOW 30 MIN: CPT | Performed by: STUDENT IN AN ORGANIZED HEALTH CARE EDUCATION/TRAINING PROGRAM

## 2024-11-11 PROCEDURE — 99213 OFFICE O/P EST LOW 20 MIN: CPT | Performed by: STUDENT IN AN ORGANIZED HEALTH CARE EDUCATION/TRAINING PROGRAM

## 2024-11-11 PROCEDURE — 73564 X-RAY EXAM KNEE 4 OR MORE: CPT | Mod: LEFT SIDE | Performed by: RADIOLOGY

## 2024-11-12 ENCOUNTER — TREATMENT (OUTPATIENT)
Dept: PHYSICAL THERAPY | Facility: CLINIC | Age: 44
End: 2024-11-12
Payer: COMMERCIAL

## 2024-11-12 DIAGNOSIS — G57.63 MORTON'S NEUROMA OF BOTH FEET: ICD-10-CM

## 2024-11-12 DIAGNOSIS — R26.2 DIFFICULTY IN WALKING INVOLVING ANKLE AND FOOT JOINT: ICD-10-CM

## 2024-11-12 PROCEDURE — 97140 MANUAL THERAPY 1/> REGIONS: CPT | Mod: GP | Performed by: PHYSICAL THERAPIST

## 2024-11-12 ASSESSMENT — PAIN - FUNCTIONAL ASSESSMENT: PAIN_FUNCTIONAL_ASSESSMENT: 0-10

## 2024-11-12 ASSESSMENT — PAIN SCALES - GENERAL: PAINLEVEL_OUTOF10: 2

## 2024-11-12 NOTE — PROGRESS NOTES
Fredrick Adamson  is a 44 y.o. year-old  female. she  is a new patient to our office and presents with a chief complaint of Left  knee pain.  Has been ongoing for about 2 weeks or so.  No injury.  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  She is notes she has had issues with this in the past was told she had patellofemoral syndrome never really did physical therapy.  Pain is all anterior in the knee worse with sitting for long periods of time going up and down stairs.      Past Medical, Family, and Social History reviewed   Review of Systems  A complete review of systems was conducted, pertinent only to the HPI noted above.  Constitutional: None  Eyes: No additions to above history  Ears, Nose, Throat: No additions to above history  Cardiovascular: No additions to above history  Respiratory: No additions to above history  GI: No additions to above history  : No additions to above history  Skin/Neuro: No additions to above history  Endocrine/Heme/Lymph: No additions to above history  Immunologic: No additions to above history  Psychiatric: No additions to above history  Musculoskeletal: see above    GEN: Alert and Oriented x 3  Constitutional: Well appearing , in no apparent distress.  Skin: No rashes, erythema, or induration around knee    MUSCULOSKELETAL EXAM:     Left KNEE:  ROM: 0/0/130  Effusion: Negative  Alignment: [neutral]      Gait: [normal]  Sensation intact bilaterally sural/saphenous/sp/dp/tibial nerve bilaterally  Motor 5/5 knee flexion/extension/foot DF/PF/EHL/FHL bilaterally  Palpable/symmetric DP and PT pulse bilaterally      PATELLAR/EXTENSOR MECHANISM:   KNEE:  Patellar Crepitus: Mild  Patellar Compression Pain: Yes  Patellar Apprehension: [no]  Extensor Mechanism: [intact]  Straight Leg Raise: good      LIGAMENTS:  ACL: Lachmans: [negative]  PCL: [stable]  Valgus at 0 degrees: [stable]  Valgus at 30 degrees:  [stable]  Varus at 0 degrees: [stable]  Varus at 30 degrees: [stable]    MENISCUS EXAM:  Joint Line Tenderness: None  McMurrays: [negative]  Pain with Deep Flexion: [No]    Xrays independently viewed and interpreted: No significant degenerative findings    The patient history, physical examination and imaging studies are consistent with patellofemoral pain syndrome. The treatment options including medical management, PT and NSAIDs were discussed at length.  We discussed the various reasons and anatomic variations associated with PF pain.  I have referred the patient to physical therapy with a specific prescription focused on quad/hamstring/hip and core strengthening as well as flexibility.     I explained to the patient, these symptoms can be protracted and can take months to improve. Nevertheless, if the symptoms do not progressively improve, I have recommended the patient follow-up with me at which point we will repeat the evaluation and modify the treatment plan.     The patient acknowledged that they understand this plan and will follow up accordingly.    All of the patient's questions and concerns were addressed today, but they know they can contact our office at any time with any further concerns.

## 2024-11-12 NOTE — PROGRESS NOTES
Physical Therapy Treatment    Patient Name: Fredrick Adamson  MRN: [id number]  Today's Date: 11/12/2024    Current Problem  Problem List Items Addressed This Visit             ICD-10-CM    Mata's neuroma of both feet G57.63    Difficulty in walking involving ankle and foot joint R26.2       Insurance:  Payor: [facility] / Plan: [facility] / Product Type: *No Product type* /   Number of Treatments Authorized: 6/20  Certification Period Start Date: 10/09/24  Certification Period End Date: 01/07/25    Subjective   General  Reason for Referral: Bilateral foot pain -  s/p neuroma excision  Referred By: Dr. Jarod Wren DPM  Past Medical History Relevant to Rehab: Neuroma excision 5/31/24  General Comment: Reports that she is feeling ~ 50% improved generally. Believes manual therapy has been most helpful. Not consistent with HEP. Using scar tissue cream 1 x a week.    Performing HEP?: Yes    Precautions  Precautions  Precautions Comment: None  Pain  Pain Assessment: 0-10  0-10 (Numeric) Pain Score: 2  Pain Type: Surgical pain  Pain Location: Foot  Pain Orientation: Right    Objective   General Observation  General Observation: TTP B scars    Treatments:    Manual Therapy  Manual Therapy Activity 1: Scar tissue  massage - B  Manual Therapy Activity 2: Mobilization of each metatarsal and 3,4th digit - shucking technique  Manual Therapy Activity 3: DTM of plantar surface at tender points B    OP EDUCATION:  Outpatient Education  Education Comment: Education re: nature of pain - ie neuro-pain science and pain. Edu re: hurt not harm approach to activity and shoeware.  Encouraged to try a variety of shoes that provide wide toe box. Recommended toe spacers to avoid aggravating surgery points.    Assessment:  PT Assessment  Assessment Comment: Continued manual therapy to allow for greater scar mobility and distal MTP mobility bilaterally.  Patient with reduced pain with ambulation though continues to  "have mild tenderness along incision and plantar surface of bilateral feet.  Will continue to focus on manual therapy to allow for greater freedom of movement and ability to except weight with minimal increase in pain and transition out of shoes. Continue with pain neuroscience edu re: pain.    Plan:  OP PT Plan  Treatment/Interventions: Dry needling, Manual therapy, Neuromuscular re-education, Self care/ home management, Taping techniques, Therapeutic activities, Therapeutic exercises  PT Plan: Skilled PT  PT Frequency:  (1-2 x a week)  Duration: 10 visits  Onset Date: 24  Certification Period Start Date: 10/09/24  Certification Period End Date: 25  Number of Treatments Authorized:   Rehab Potential: Good  Check response to manual this session and if client is trying different shoes.   Goals:  Active       PT Problem       PT Goal 1       Start:  10/09/24    Expected End:  25       ST) Patient will improve LEFS score by 9 points in order to perform functional activities at home and in the community in 4 weeks.  2) Patient will be able to complete ADLs with pain less than 1/10 in 4 weeks. L foot typically 0-3/10   R foot 3-5/10 - 50% improved.   3) Pt will improve foot/ankle AROM to equal opposite foot without pain or \"pulling\" to complete ADLs with less difficulty in 4 weeks.   4) Patient will be independent with HEP to allow for continued improvement in daily tasks at home and in the community in 3 visits.   LT) Patient will have strength at intrinsic foot musculature to equal opposite LE to allow pain free barefoot walking at home in 6 weeks. PROGRESSING.  2) Patient will report sensation changes at feet have resolved in order to stand and walk without pain for work and home duties in 6 weeks.   3) Patient will tolerate walk/jog in clinic for eventual return to fitness activities without pain for health management in 6 weeks.    4) Patient will improve LEFS score >/=70/80 points in " order to perform functional activities at home and in the community by discharge.               Time Calculation  Start Time: 1600  Stop Time: 1642  Time Calculation (min): 42 min  PT Therapeutic Procedures Time Entry  Manual Therapy Time Entry: 38,

## 2024-11-13 ENCOUNTER — HOSPITAL ENCOUNTER (OUTPATIENT)
Dept: RADIOLOGY | Facility: HOSPITAL | Age: 44
Discharge: HOME | End: 2024-11-13
Payer: COMMERCIAL

## 2024-11-13 ENCOUNTER — APPOINTMENT (OUTPATIENT)
Dept: RADIOLOGY | Facility: HOSPITAL | Age: 44
End: 2024-11-13
Payer: COMMERCIAL

## 2024-11-13 DIAGNOSIS — N95.0 POSTMENOPAUSAL BLEEDING: ICD-10-CM

## 2024-11-13 PROCEDURE — 76856 US EXAM PELVIC COMPLETE: CPT | Performed by: RADIOLOGY

## 2024-11-13 PROCEDURE — 76830 TRANSVAGINAL US NON-OB: CPT | Performed by: RADIOLOGY

## 2024-11-13 PROCEDURE — 76856 US EXAM PELVIC COMPLETE: CPT

## 2024-11-14 ENCOUNTER — APPOINTMENT (OUTPATIENT)
Dept: RADIOLOGY | Facility: CLINIC | Age: 44
End: 2024-11-14
Payer: COMMERCIAL

## 2024-11-18 ENCOUNTER — APPOINTMENT (OUTPATIENT)
Dept: RADIOLOGY | Facility: CLINIC | Age: 44
End: 2024-11-18
Payer: COMMERCIAL

## 2024-11-20 ENCOUNTER — TREATMENT (OUTPATIENT)
Dept: PHYSICAL THERAPY | Facility: CLINIC | Age: 44
End: 2024-11-20
Payer: COMMERCIAL

## 2024-11-20 DIAGNOSIS — G57.63 MORTON'S NEUROMA OF BOTH FEET: ICD-10-CM

## 2024-11-20 DIAGNOSIS — R26.2 DIFFICULTY IN WALKING INVOLVING ANKLE AND FOOT JOINT: ICD-10-CM

## 2024-11-20 PROCEDURE — 97110 THERAPEUTIC EXERCISES: CPT | Mod: GP | Performed by: PHYSICAL THERAPIST

## 2024-11-20 PROCEDURE — 97140 MANUAL THERAPY 1/> REGIONS: CPT | Mod: GP | Performed by: PHYSICAL THERAPIST

## 2024-11-20 ASSESSMENT — PAIN - FUNCTIONAL ASSESSMENT: PAIN_FUNCTIONAL_ASSESSMENT: 0-10

## 2024-11-20 ASSESSMENT — PAIN SCALES - GENERAL: PAINLEVEL_OUTOF10: 4

## 2024-11-20 NOTE — PROGRESS NOTES
Physical Therapy Treatment    Patient Name: Fredrick Adamson  MRN: [id number]  Today's Date: 11/20/2024    Current Problem  Problem List Items Addressed This Visit             ICD-10-CM    Mata's neuroma of both feet G57.63    Difficulty in walking involving ankle and foot joint R26.2       Insurance:  Payor: [facility] / Plan: [facility] / Product Type: *No Product type* /   Number of Treatments Authorized: 7/20  Certification Period Start Date: 10/09/24  Certification Period End Date: 01/07/25    Subjective   General  Reason for Referral: Bilateral foot pain -  s/p neuroma excision  Referred By: Dr. Jarod Wren DPM  Past Medical History Relevant to Rehab: Neuroma excision 5/31/24  General Comment: Feet have been hurting more over the last week. L foot at plantar surface. R foot at ball of foot. Has been trying to use different shoes. Has not been exercising as much due to her busy schedule.    Performing HEP?: No    Precautions  Precautions  Precautions Comment: None  Pain  Pain Assessment: 0-10  0-10 (Numeric) Pain Score: 4  Pain Type: Neuropathic pain  Pain Location: Foot  Pain Orientation: Right, Left    Objective   General Observation  General Observation: TTP B scars    Treatments:    Therapeutic Exercise  Therapeutic Exercise Activity 1: BOSU lunge: ALT x 20 - in socks  Therapeutic Exercise Activity 2: BOSU step ups: x 20 in socks  Therapeutic Exercise Activity 3: Heel raises on AirEx Pad: 2 x 8  Therapeutic Exercise Activity 4: Gastroc/soleus stretch: 10 hold x 3     Manual Therapy  Manual Therapy Activity 1: Scar tissue massage - B  Manual Therapy Activity 2: Mobilization of each metatarsal -  3,4 digit - shucking tech  Manual Therapy Activity 3: DTM of plantar surface at tender points - B    OP EDUCATION:  Outpatient Education  Education Comment: Education re: nerves needing space, blood flow and movement for best healing environment. Encouraged spacer for toes - especially  "R between 3-4.    Assessment:  PT Assessment  Assessment Comment: Continued manual therapy to allow for greater scar mobility and distal MTP mobility bilaterally.  Patient with increased calf tissue tightness and weakness aeb response to calf stretches and heel raises.  Will continue to focus on manual therapy to allow for greater freedom of movement and ability to except weight with minimal increase in pain and transition out of shoes. Continue with pain neuroscience edu re: pain.    Plan:  OP PT Plan  Treatment/Interventions: Dry needling, Manual therapy, Neuromuscular re-education, Self care/ home management, Taping techniques, Therapeutic activities, Therapeutic exercises  PT Plan: Skilled PT  PT Frequency:  (1-2 x a week)  Duration: 10 visits  Onset Date: 24  Certification Period Start Date: 10/09/24  Certification Period End Date: 25  Number of Treatments Authorized:   Rehab Potential: Good    Goals:  Active       PT Problem       PT Goal 1       Start:  10/09/24    Expected End:  25       ST) Patient will improve LEFS score by 9 points in order to perform functional activities at home and in the community in 4 weeks.  2) Patient will be able to complete ADLs with pain less than 1/10 in 4 weeks. L foot typically 0-3/10   R foot 3-5/10 - 50% improved.   3) Pt will improve foot/ankle AROM to equal opposite foot without pain or \"pulling\" to complete ADLs with less difficulty in 4 weeks.   4) Patient will be independent with HEP to allow for continued improvement in daily tasks at home and in the community in 3 visits.   LT) Patient will have strength at intrinsic foot musculature to equal opposite LE to allow pain free barefoot walking at home in 6 weeks. PROGRESSING.  2) Patient will report sensation changes at feet have resolved in order to stand and walk without pain for work and home duties in 6 weeks.   3) Patient will tolerate walk/jog in clinic for eventual return to fitness " activities without pain for health management in 6 weeks.    4) Patient will improve LEFS score >/=70/80 points in order to perform functional activities at home and in the community by discharge.               Time Calculation  Start Time: 0700  Stop Time: 0745  Time Calculation (min): 45 min  PT Therapeutic Procedures Time Entry  Manual Therapy Time Entry: 28  Therapeutic Exercise Time Entry: 12,

## 2024-11-26 ENCOUNTER — TREATMENT (OUTPATIENT)
Dept: PHYSICAL THERAPY | Facility: CLINIC | Age: 44
End: 2024-11-26
Payer: COMMERCIAL

## 2024-11-26 DIAGNOSIS — R26.2 DIFFICULTY IN WALKING INVOLVING ANKLE AND FOOT JOINT: ICD-10-CM

## 2024-11-26 DIAGNOSIS — G57.63 MORTON'S NEUROMA OF BOTH FEET: ICD-10-CM

## 2024-11-26 PROCEDURE — 97140 MANUAL THERAPY 1/> REGIONS: CPT | Mod: GP | Performed by: PHYSICAL THERAPIST

## 2024-11-26 ASSESSMENT — PAIN - FUNCTIONAL ASSESSMENT: PAIN_FUNCTIONAL_ASSESSMENT: 0-10

## 2024-11-26 ASSESSMENT — PAIN SCALES - GENERAL: PAINLEVEL_OUTOF10: 5 - MODERATE PAIN

## 2024-11-26 NOTE — PROGRESS NOTES
Physical Therapy Treatment    Patient Name: Fredrick Adamson  MRN: [id number]  Today's Date: 11/26/2024    Current Problem  Problem List Items Addressed This Visit             ICD-10-CM    Mata's neuroma of both feet G57.63    Difficulty in walking involving ankle and foot joint R26.2       Insurance:  Payor: [facility] / Plan: [facility] / Product Type: *No Product type* /   Number of Treatments Authorized: 8/20  Certification Period Start Date: 10/09/24  Certification Period End Date: 01/07/25    Subjective   General  Reason for Referral: Bilateral foot pain -  s/p neuroma excision  Referred By: Dr. Jarod Wren DPM  Past Medical History Relevant to Rehab: Neuroma excision 5/31/24  General Comment: Patient states that her feet have been more sore recently. Feels her arch on her L collapsing more.    Performing HEP?: Yes    Precautions  Precautions  Precautions Comment: None  Pain  Pain Assessment: 0-10  0-10 (Numeric) Pain Score: 5 - Moderate pain  Pain Type: Neuropathic pain  Pain Location: Foot  Pain Orientation: Right, Left (Bottom)    Objective     General Observation  General Observation: TTP PF and tarsal tunnel, Taught sciatic    Treatments:    Therapeutic Exercise  Therapeutic Exercise Activity 1: Sciatic glider x 10 ea         Manual Therapy  Manual Therapy Activity 1: Scar tissue mobilization- B  Manual Therapy Activity 2: IASTM to B Plantar foot and posterior tibial  Manual Therapy Activity 3: Grade 3 sub talar distraction and EV tilts    Assessment:  PT Assessment  Assessment Comment: Patient with increased tightness noted through bilateral plantar fascia and tarsal tunnel.  Release allowed for greater neural mobility however remains taut along sciatic pathway.  Instructed on home program and importance of toe yoga for intrinsic stability as well as nerve mobilization.    Plan:  OP PT Plan  Treatment/Interventions: Dry needling, Manual therapy, Neuromuscular re-education,  "Self care/ home management, Taping techniques, Therapeutic activities, Therapeutic exercises  PT Plan: Skilled PT  PT Frequency:  (1-2 x a week)  Duration: 10 visits  Onset Date: 24  Certification Period Start Date: 10/09/24  Certification Period End Date: 25  Number of Treatments Authorized:   Rehab Potential: Good    Goals:  Active       PT Problem       PT Goal 1       Start:  10/09/24    Expected End:  25       ST) Patient will improve LEFS score by 9 points in order to perform functional activities at home and in the community in 4 weeks.  2) Patient will be able to complete ADLs with pain less than 1/10 in 4 weeks. L foot typically 0-3/10   R foot 3-5/10 - 50% improved.   3) Pt will improve foot/ankle AROM to equal opposite foot without pain or \"pulling\" to complete ADLs with less difficulty in 4 weeks.   4) Patient will be independent with HEP to allow for continued improvement in daily tasks at home and in the community in 3 visits.   LT) Patient will have strength at intrinsic foot musculature to equal opposite LE to allow pain free barefoot walking at home in 6 weeks. PROGRESSING.  2) Patient will report sensation changes at feet have resolved in order to stand and walk without pain for work and home duties in 6 weeks.   3) Patient will tolerate walk/jog in clinic for eventual return to fitness activities without pain for health management in 6 weeks.    4) Patient will improve LEFS score >/=70/80 points in order to perform functional activities at home and in the community by discharge.               Time Calculation  Start Time: 1206  Stop Time: 1245  Time Calculation (min): 39 min  PT Therapeutic Procedures Time Entry  Manual Therapy Time Entry: 37  Therapeutic Exercise Time Entry: 2,    "

## 2024-12-11 ENCOUNTER — TREATMENT (OUTPATIENT)
Dept: PHYSICAL THERAPY | Facility: CLINIC | Age: 44
End: 2024-12-11
Payer: COMMERCIAL

## 2024-12-11 DIAGNOSIS — R26.2 DIFFICULTY IN WALKING INVOLVING ANKLE AND FOOT JOINT: ICD-10-CM

## 2024-12-11 DIAGNOSIS — G57.63 MORTON'S NEUROMA OF BOTH FEET: ICD-10-CM

## 2024-12-11 PROCEDURE — 97140 MANUAL THERAPY 1/> REGIONS: CPT | Mod: GP | Performed by: PHYSICAL THERAPIST

## 2024-12-11 ASSESSMENT — PAIN - FUNCTIONAL ASSESSMENT: PAIN_FUNCTIONAL_ASSESSMENT: 0-10

## 2024-12-11 ASSESSMENT — PAIN SCALES - GENERAL: PAINLEVEL_OUTOF10: 3

## 2024-12-11 NOTE — PROGRESS NOTES
Physical Therapy Treatment    Patient Name: Fredrick Adamosn  MRN: [id number]  Today's Date: 12/11/2024    Current Problem  Problem List Items Addressed This Visit             ICD-10-CM    Mata's neuroma of both feet G57.63    Difficulty in walking involving ankle and foot joint R26.2       Insurance:  Payor: [facility] / Plan: [facility] / Product Type: *No Product type* /   Number of Treatments Authorized: 9/20  Certification Period Start Date: 10/09/24  Certification Period End Date: 01/07/25    Subjective   General  Reason for Referral: Bilateral foot pain -  s/p neuroma excision  Referred By: Dr. Jarod Wren DPM  Past Medical History Relevant to Rehab: Neuroma excision 5/31/24  General Comment: Patient states that for a week after last visit she has significantly reduced pain and was able to be barefoot at home.    Performing HEP?: No    Precautions  Precautions  Precautions Comment: None  Pain  Pain Assessment: 0-10  0-10 (Numeric) Pain Score: 3  Pain Type: Neuropathic pain  Pain Location: Foot  Pain Orientation:  (R>L)    Objective     General Observation  General Observation: TTP medial B tibiofemoral joint line, mild valgus collapse ea side with step up and anterior step down. 4-/5 B hip abduction    Treatments:    Therapeutic Exercise  Therapeutic Exercise Activity 1: Sciatic glider 3 x 10 ea         Manual Therapy  Manual Therapy Activity 1: Scar tissue mobilization- B  Manual Therapy Activity 2: IASTM to B Plantar foot and posterior tibial  Manual Therapy Activity 3: Grade 3 sub talar distraction and EV tilts    OP EDUCATION:  Outpatient Education  Education Comment: Access Code: [email or web address]  URL: [email or web address]  Date: 12/11/2024  Prepared by: Stevie Jean    Exercises  - Side Stepping with Resistance at Feet  - 1 x daily - 7 x weekly - 3 sets - 10 reps    Assessment:  PT Assessment  Assessment Comment: Patient remaines tender in posterior tibial  "bilaterally as well as tarsal tunnel.  Overall progressed well with manual therapy sore after the session.  Improved mobility and sciatic nerve tender along medial knee joint and presents with reduced functional mobility and lower extremity due to hip weakness as well as quadricep weakness.  Will progress as foot pain resolves into full lower extremity strengthening.    Plan:  OP PT Plan  Treatment/Interventions: Dry needling, Manual therapy, Neuromuscular re-education, Self care/ home management, Taping techniques, Therapeutic activities, Therapeutic exercises  PT Plan: Skilled PT  PT Frequency:  (1-2 x a week)  Duration: 10 visits  Onset Date: 24  Certification Period Start Date: 10/09/24  Certification Period End Date: 25  Number of Treatments Authorized:   Rehab Potential: Good    Goals:  Active       PT Problem       PT Goal 1       Start:  10/09/24    Expected End:  25       ST) Patient will improve LEFS score by 9 points in order to perform functional activities at home and in the community in 4 weeks.  2) Patient will be able to complete ADLs with pain less than 1/10 in 4 weeks. L foot typically 0-3/10   R foot 3-5/10 - 50% improved.   3) Pt will improve foot/ankle AROM to equal opposite foot without pain or \"pulling\" to complete ADLs with less difficulty in 4 weeks.   4) Patient will be independent with HEP to allow for continued improvement in daily tasks at home and in the community in 3 visits.   LT) Patient will have strength at intrinsic foot musculature to equal opposite LE to allow pain free barefoot walking at home in 6 weeks. PROGRESSING.  2) Patient will report sensation changes at feet have resolved in order to stand and walk without pain for work and home duties in 6 weeks.   3) Patient will tolerate walk/jog in clinic for eventual return to fitness activities without pain for health management in 6 weeks.    4) Patient will improve LEFS score >/=70/80 points in " order to perform functional activities at home and in the community by discharge.               Time Calculation  Start Time: 0745  Stop Time: 0829  Time Calculation (min): 44 min  PT Therapeutic Procedures Time Entry  Manual Therapy Time Entry: 38  Therapeutic Exercise Time Entry: 5,

## 2024-12-12 ENCOUNTER — APPOINTMENT (OUTPATIENT)
Dept: PHYSICAL THERAPY | Facility: CLINIC | Age: 44
End: 2024-12-12
Payer: COMMERCIAL

## 2024-12-12 DIAGNOSIS — R26.2 DIFFICULTY IN WALKING INVOLVING ANKLE AND FOOT JOINT: ICD-10-CM

## 2024-12-12 DIAGNOSIS — G57.63 MORTON'S NEUROMA OF BOTH FEET: ICD-10-CM

## 2024-12-18 ENCOUNTER — APPOINTMENT (OUTPATIENT)
Dept: PHYSICAL THERAPY | Facility: CLINIC | Age: 44
End: 2024-12-18
Payer: COMMERCIAL

## 2024-12-18 DIAGNOSIS — G57.63 MORTON'S NEUROMA OF BOTH FEET: ICD-10-CM

## 2024-12-18 DIAGNOSIS — R26.2 DIFFICULTY IN WALKING INVOLVING ANKLE AND FOOT JOINT: ICD-10-CM

## 2024-12-19 ENCOUNTER — TREATMENT (OUTPATIENT)
Dept: PHYSICAL THERAPY | Facility: CLINIC | Age: 44
End: 2024-12-19
Payer: COMMERCIAL

## 2024-12-19 DIAGNOSIS — R26.2 DIFFICULTY IN WALKING INVOLVING ANKLE AND FOOT JOINT: ICD-10-CM

## 2024-12-19 DIAGNOSIS — G57.63 MORTON'S NEUROMA OF BOTH FEET: ICD-10-CM

## 2024-12-19 PROCEDURE — 97110 THERAPEUTIC EXERCISES: CPT | Mod: GP | Performed by: PHYSICAL THERAPIST

## 2024-12-19 PROCEDURE — 97140 MANUAL THERAPY 1/> REGIONS: CPT | Mod: GP | Performed by: PHYSICAL THERAPIST

## 2024-12-19 ASSESSMENT — PAIN SCALES - GENERAL: PAINLEVEL_OUTOF10: 0 - NO PAIN

## 2024-12-19 ASSESSMENT — PAIN - FUNCTIONAL ASSESSMENT: PAIN_FUNCTIONAL_ASSESSMENT: 0-10

## 2024-12-19 NOTE — PROGRESS NOTES
Physical Therapy Treatment    Patient Name: Fredrick Adamson  MRN: [id number]  Today's Date: 12/19/2024    Current Problem  Problem List Items Addressed This Visit             ICD-10-CM    Mata's neuroma of both feet G57.63    Difficulty in walking involving ankle and foot joint R26.2       Insurance:  Payor: [facility] / Plan: [facility] / Product Type: *No Product type* /   Number of Treatments Authorized: 10/20  Certification Period Start Date: 10/09/24  Certification Period End Date: 01/07/25    Subjective   General  Reason for Referral: Bilateral foot pain -  s/p neuroma excision  Referred By: Dr. Jarod Wren DPM  Past Medical History Relevant to Rehab: Neuroma excision 5/31/24  General Comment: Patient states that she has had a good week of pain in her foot. Notes that she is only sore at the end of the day when she presses on the bottom of her foot and feels bruised.    Performing HEP?: Yes    Precautions  Precautions  Precautions Comment: None  Pain  Pain Assessment: 0-10  0-10 (Numeric) Pain Score: 0 - No pain  Pain Type: Neuropathic pain  Pain Location: Foot    Objective   TTP L tarsal tunnel    Treatments:    Therapeutic Exercise  Therapeutic Exercise Activity 1: Seated toe yoga x 4 min  Therapeutic Exercise Activity 2: Quad set x 10 on L         Manual Therapy  Manual Therapy Performed: Yes  Manual Therapy Activity 1: Scar tissue mobilization- B  Manual Therapy Activity 2: IASTM to B Plantar foot and posterior tibial  Manual Therapy Activity 3: Grade 3 sub talar distraction and EV tilts  Manual Therapy Activity 4: Grade3 superior patellar glides on L      Assessment:  PT Assessment  Assessment Comment: Patient with improved passive flexion of bilateral third and fourth phalanges.  Remains tender to pressure however standing after the session minimal tenderness noted.  Greater restriction along tarsal tunnel on left compared to the right as well as increased tenderness.   "Mild reduction in superior patellar glide on the left and educated on submaximal quad sets to allow for reduced patellar irritation.    Plan:  OP PT Plan  Treatment/Interventions: Dry needling, Manual therapy, Neuromuscular re-education, Self care/ home management, Taping techniques, Therapeutic activities, Therapeutic exercises  PT Plan: Skilled PT  PT Frequency:  (1-2 x a week)  Duration: 10 visits  Onset Date: 24  Certification Period Start Date: 10/09/24  Certification Period End Date: 25  Number of Treatments Authorized: 10/20  Rehab Potential: Good    Goals:  Active       PT Problem       PT Goal 1       Start:  10/09/24    Expected End:  25       ST) Patient will improve LEFS score by 9 points in order to perform functional activities at home and in the community in 4 weeks.  2) Patient will be able to complete ADLs with pain less than 1/10 in 4 weeks. L foot typically 0-3/10   R foot 3-5/10 - 50% improved.   3) Pt will improve foot/ankle AROM to equal opposite foot without pain or \"pulling\" to complete ADLs with less difficulty in 4 weeks.   4) Patient will be independent with HEP to allow for continued improvement in daily tasks at home and in the community in 3 visits.   LT) Patient will have strength at intrinsic foot musculature to equal opposite LE to allow pain free barefoot walking at home in 6 weeks. PROGRESSING.  2) Patient will report sensation changes at feet have resolved in order to stand and walk without pain for work and home duties in 6 weeks.   3) Patient will tolerate walk/jog in clinic for eventual return to fitness activities without pain for health management in 6 weeks.    4) Patient will improve LEFS score >/=70/80 points in order to perform functional activities at home and in the community by discharge.               Time Calculation  Start Time:   Stop Time:   Time Calculation (min): 41 min  PT Therapeutic Procedures Time Entry  Manual Therapy " Time Entry: 32  Therapeutic Exercise Time Entry: 8,

## 2024-12-23 ENCOUNTER — APPOINTMENT (OUTPATIENT)
Dept: PHYSICAL THERAPY | Facility: CLINIC | Age: 44
End: 2024-12-23
Payer: COMMERCIAL

## 2024-12-23 DIAGNOSIS — G57.63 MORTON'S NEUROMA OF BOTH FEET: ICD-10-CM

## 2024-12-23 DIAGNOSIS — R26.2 DIFFICULTY IN WALKING INVOLVING ANKLE AND FOOT JOINT: ICD-10-CM

## 2024-12-27 ENCOUNTER — APPOINTMENT (OUTPATIENT)
Dept: PHYSICAL THERAPY | Facility: CLINIC | Age: 44
End: 2024-12-27
Payer: COMMERCIAL

## 2024-12-27 DIAGNOSIS — G57.63 MORTON'S NEUROMA OF BOTH FEET: ICD-10-CM

## 2024-12-27 DIAGNOSIS — R26.2 DIFFICULTY IN WALKING INVOLVING ANKLE AND FOOT JOINT: ICD-10-CM

## 2024-12-30 ENCOUNTER — OFFICE VISIT (OUTPATIENT)
Dept: PRIMARY CARE | Facility: EXTERNAL LOCATION | Age: 44
End: 2024-12-30

## 2024-12-30 VITALS
DIASTOLIC BLOOD PRESSURE: 70 MMHG | WEIGHT: 125 LBS | OXYGEN SATURATION: 96 % | BODY MASS INDEX: 20.18 KG/M2 | HEART RATE: 100 BPM | TEMPERATURE: 98.1 F | SYSTOLIC BLOOD PRESSURE: 104 MMHG

## 2024-12-30 DIAGNOSIS — R05.1 ACUTE COUGH: ICD-10-CM

## 2024-12-30 DIAGNOSIS — R50.9 FEVER, UNSPECIFIED FEVER CAUSE: ICD-10-CM

## 2024-12-30 DIAGNOSIS — R53.81 MALAISE: ICD-10-CM

## 2024-12-30 DIAGNOSIS — J06.9 UPPER RESPIRATORY TRACT INFECTION, UNSPECIFIED TYPE: Primary | ICD-10-CM

## 2024-12-30 PROCEDURE — 87502 INFLUENZA DNA AMP PROBE: CPT | Performed by: NURSE PRACTITIONER

## 2024-12-30 RX ORDER — BENZONATATE 200 MG/1
200 CAPSULE ORAL 3 TIMES DAILY PRN
Qty: 21 CAPSULE | Refills: 0 | Status: SHIPPED | OUTPATIENT
Start: 2024-12-30 | End: 2025-01-06

## 2024-12-30 RX ORDER — AZITHROMYCIN 250 MG/1
TABLET, FILM COATED ORAL
Qty: 6 TABLET | Refills: 0 | Status: SHIPPED | OUTPATIENT
Start: 2024-12-30 | End: 2025-01-04

## 2024-12-30 ASSESSMENT — ENCOUNTER SYMPTOMS
FEVER: 1
SORE THROAT: 0
SINUS PAIN: 0
COUGH: 1
SINUS PRESSURE: 0
CHILLS: 1
SHORTNESS OF BREATH: 0

## 2024-12-30 NOTE — PROGRESS NOTES
Subjective   Patient ID: Fredrick Adamson is a 44 y.o. female who presents for Cough (4 days, cough is productive/Cough getting worse, keeping up at night/Pulled muscles/Can't take a deep breath), Generalized Body Aches, and Chills.    HPI:  5 days ago with cough, congestion, chills and body aches. Low grade fever.   Some chest aches from cough. No chest pain. No SOB.   No sinus pressure.   No history of cardiovascular disease. Denies history of palpitations.     No Known Allergies    Current Outpatient Medications   Medication Sig Dispense Refill    clobetasol (Temovate) 0.05 % ointment Apply topically 2 times a day. APPLY TO VULVA AREA 2X WEEKLY AS DIRECTED       No current facility-administered medications for this visit.        Past Medical History:   Diagnosis Date    Atypical ductal hyperplasia, breast     Other specified postprocedural states     History of in vitro fertilization       Past Surgical History:   Procedure Laterality Date    OTHER SURGICAL HISTORY  08/01/2017    [facility] In Vitro Fertilization       Review of Systems   Constitutional:  Positive for chills and fever (low grade).        Claims body aches    HENT:  Positive for congestion. Negative for sinus pressure, sinus pain and sore throat.    Respiratory:  Positive for cough. Negative for shortness of breath.         Claims chest wall aches from coughing    Cardiovascular:  Negative for chest pain.       Objective   Visit Vitals  /70   Pulse 100   Temp 36.7 °C (98.1 °F) (Oral)   Wt 56.7 kg (125 lb)   SpO2 96%   BMI 20.18 kg/m²   OB Status Ablation   Smoking Status Never   BSA 1.62 m²       Physical Exam  Constitutional:       General: She is not in acute distress.     Appearance: Normal appearance. She is not ill-appearing or toxic-appearing.      Comments: Sounds congested. Dry cough heard. NAD   HENT:      Right Ear: Tympanic membrane, ear canal and external ear normal.      Left Ear: Tympanic membrane, ear canal and  external ear normal.      Nose: Nose normal.      Mouth/Throat:      Mouth: Mucous membranes are moist.      Pharynx: Oropharynx is clear. No oropharyngeal exudate or posterior oropharyngeal erythema.   Eyes:      General:         Right eye: No discharge.         Left eye: No discharge.      Extraocular Movements: Extraocular movements intact.      Conjunctiva/sclera: Conjunctivae normal.   Cardiovascular:      Rate and Rhythm: Normal rate and regular rhythm.   Pulmonary:      Effort: Pulmonary effort is normal. No respiratory distress.      Breath sounds: Normal breath sounds. No wheezing or rhonchi.      Comments: Dry cough heard   Chest:      Chest wall: Tenderness (ttp anterior chest wall) present.   Musculoskeletal:      Cervical back: Neck supple.   Lymphadenopathy:      Cervical: No cervical adenopathy.   Neurological:      General: No focal deficit present.      Mental Status: She is alert.   Psychiatric:         Mood and Affect: Mood normal.         Behavior: Behavior normal.         Thought Content: Thought content normal.         Assessment/Plan   Diagnoses and all orders for this visit:  Upper respiratory tract infection, unspecified type  -     Influenza A, and B PCR  -     azithromycin (Zithromax) 250 mg tablet; Take 2 tablets (500 mg) by mouth once daily for 1 day, THEN 1 tablet (250 mg) once daily for 4 days. Take 2 tabs (500 mg) by mouth today, than 1 daily for 4 days..  -     benzonatate (Tessalon) 200 mg capsule; Take 1 capsule (200 mg) by mouth 3 times a day as needed for cough for up to 7 days. Do not crush or chew.  Acute cough  -     Influenza A, and B PCR  -     benzonatate (Tessalon) 200 mg capsule; Take 1 capsule (200 mg) by mouth 3 times a day as needed for cough for up to 7 days. Do not crush or chew.  Malaise  -     Influenza A, and B PCR  Fever, unspecified fever cause  -     Influenza A, and B PCR    - Discussed viral versus bacterial etiology and expected course of illness.   - Will  check flu per patients request.   - Will give antibiotic but instructed to take only if no improvement in the next 3-4 days. Patient agreeable.   - Antibiotic sent to pharmacy. Possible side effects and interactions discussed.  - Recommend probiotic while on antibiotic.   - Recommend Flonase daily, decongestant as needed, increase liquid intake and use humidified air in sleeping areas.   - Follow up with PCP if no improvement with antibiotic.  Can be seen sooner in UC with concerns or with any worsening symptoms.

## 2024-12-30 NOTE — PATIENT INSTRUCTIONS
- Discussed viral versus bacterial etiology and expected course of illness.    - Increase liquid intake.  - Can use over the counter medication as needed.   - Use humidified air in sleeping areas.   - Follow up with PCP if no improvement.   - Return to clinic or go to urgent care sooner with concerns or worsening symptoms.     STEPS YOU CAN TAKE AT HOME  - Get lots of rest, and drink plenty of liquids.  - Drink hot tea.  - Suck on cough drops or hard candy.  - Take over-the-counter cough and cold medicines.  - Breath in warm, moist air, such as in the shower, over a kettle, or from a humidifier.  - Use humidified air in sleeping areas.   - Take a pain-relieving medicine if you have cold or flu symptoms like headache, muscle aches, or joint pain  - Avoid smoking or being around others who smoke.   - Wash hands often.   - Do not share utensils and drinking glasses. Wash these objects with hot, soapy water.  - Do not share foods or drinks with others while you or they are sick.    CALL YOUR PROVIDER/GO TO URGENT CARE OR ED IF:   - You have trouble breathing or swallowing.  - Your neck, tongue, or throat is swollen.  - You are drooling because you cannot swallow your own saliva  - You cannot keep fluids down  - You develop chest pain or shortness of breath  - Your voice sounds strange, like you are talking through your nose.  - You can’t open your mouth all the way.  - You have a stiff neck.   - Fever higher than 100.4°F (38°C)  - Chest pain when you cough, trouble breathing, or coughing up blood  - A barking cough that makes it hard to talk  - Cough and weight loss that you cannot explain  - Symptoms that are worsening

## 2024-12-31 ENCOUNTER — APPOINTMENT (OUTPATIENT)
Dept: PHYSICAL THERAPY | Facility: CLINIC | Age: 44
End: 2024-12-31
Payer: COMMERCIAL

## 2024-12-31 DIAGNOSIS — R26.2 DIFFICULTY IN WALKING INVOLVING ANKLE AND FOOT JOINT: ICD-10-CM

## 2024-12-31 DIAGNOSIS — G57.63 MORTON'S NEUROMA OF BOTH FEET: ICD-10-CM

## 2024-12-31 LAB
FLUAV RNA RESP QL NAA+PROBE: NOT DETECTED
FLUBV RNA RESP QL NAA+PROBE: NOT DETECTED

## 2025-01-09 ENCOUNTER — APPOINTMENT (OUTPATIENT)
Dept: PHYSICAL THERAPY | Facility: CLINIC | Age: 45
End: 2025-01-09
Payer: COMMERCIAL

## 2025-01-09 DIAGNOSIS — G57.63 MORTON'S NEUROMA OF BOTH FEET: ICD-10-CM

## 2025-01-09 DIAGNOSIS — R26.2 DIFFICULTY IN WALKING INVOLVING ANKLE AND FOOT JOINT: ICD-10-CM

## 2025-01-13 ENCOUNTER — APPOINTMENT (OUTPATIENT)
Dept: PHYSICAL THERAPY | Facility: CLINIC | Age: 45
End: 2025-01-13
Payer: COMMERCIAL

## 2025-01-13 DIAGNOSIS — G57.63 MORTON'S NEUROMA OF BOTH FEET: ICD-10-CM

## 2025-01-13 DIAGNOSIS — R26.2 DIFFICULTY IN WALKING INVOLVING ANKLE AND FOOT JOINT: ICD-10-CM

## 2025-01-14 ENCOUNTER — APPOINTMENT (OUTPATIENT)
Dept: PHYSICAL THERAPY | Facility: CLINIC | Age: 45
End: 2025-01-14
Payer: COMMERCIAL

## 2025-01-14 DIAGNOSIS — G57.63 MORTON'S NEUROMA OF BOTH FEET: ICD-10-CM

## 2025-01-14 DIAGNOSIS — R26.2 DIFFICULTY IN WALKING INVOLVING ANKLE AND FOOT JOINT: ICD-10-CM

## 2025-01-21 ENCOUNTER — APPOINTMENT (OUTPATIENT)
Dept: PHYSICAL THERAPY | Facility: CLINIC | Age: 45
End: 2025-01-21
Payer: COMMERCIAL

## 2025-01-21 DIAGNOSIS — R26.2 DIFFICULTY IN WALKING INVOLVING ANKLE AND FOOT JOINT: ICD-10-CM

## 2025-01-21 DIAGNOSIS — G57.63 MORTON'S NEUROMA OF BOTH FEET: ICD-10-CM

## 2025-01-28 ENCOUNTER — TREATMENT (OUTPATIENT)
Dept: PHYSICAL THERAPY | Facility: CLINIC | Age: 45
End: 2025-01-28
Payer: COMMERCIAL

## 2025-01-28 DIAGNOSIS — R26.2 DIFFICULTY IN WALKING INVOLVING ANKLE AND FOOT JOINT: ICD-10-CM

## 2025-01-28 DIAGNOSIS — G57.63 MORTON'S NEUROMA OF BOTH FEET: Primary | ICD-10-CM

## 2025-01-28 PROCEDURE — 97140 MANUAL THERAPY 1/> REGIONS: CPT | Mod: GP | Performed by: PHYSICAL THERAPIST

## 2025-01-28 ASSESSMENT — PAIN SCALES - GENERAL: PAINLEVEL_OUTOF10: 3

## 2025-01-28 ASSESSMENT — PAIN - FUNCTIONAL ASSESSMENT: PAIN_FUNCTIONAL_ASSESSMENT: 0-10

## 2025-01-28 NOTE — PROGRESS NOTES
Physical Therapy Treatment    Patient Name: Fredrick Adamson  MRN: [id number]  Today's Date: 1/28/2025    Current Problem  Problem List Items Addressed This Visit             ICD-10-CM    Mata's neuroma of both feet - Primary G57.63    Relevant Orders    Follow Up In Physical Therapy    Difficulty in walking involving ankle and foot joint R26.2    Relevant Orders    Follow Up In Physical Therapy       Insurance:  Payor: [facility] / Plan: [facility] / Product Type: *No Product type* /   Number of Treatments Authorized: 1/20  Certification Period Start Date: 10/09/24  Certification Period End Date: 01/07/25    Subjective   General  Reason for Referral: Bilateral foot pain -  s/p neuroma excision  Referred By: Dr. Jarod Wren DPM  Past Medical History Relevant to Rehab: Neuroma excision 5/31/24  General Comment: Patient states that she has been doing well with her feet. Notes having some discomfort in the R foot more than L recently. Has been wearing ugg boots and no slippers at home.    Performing HEP?: Yes    Precautions  Precautions  Precautions Comment: None  Pain  Pain Assessment: 0-10  0-10 (Numeric) Pain Score: 3  Pain Type: Neuropathic pain  Pain Location: Foot    Objective   TTP distal 3-4 rays    Treatments:    Manual Therapy  Manual Therapy Activity 1: Scar tissue mobilization- B  Manual Therapy Activity 2: IASTM to B Plantar foot and posterior tibial  Manual Therapy Activity 3: Grade 3 sub talar distraction and EV tilts    OP EDUCATION:  Outpatient Education  Education Comment: Access Code: [email or web address]  URL: [email or web address]  Date: 01/28/2025  Prepared by: Stevie Jean    Exercises  - Side Stepping with Resistance at Feet  - 1 x daily - 7 x weekly - 3 sets - 10 reps  - Long Sitting Quad Set  - 1 x daily - 7 x weekly - 3 sets - 10 reps  - Sit to Stand  - 1 x daily - 7 x weekly - 3 sets - 10 reps    Assessment:  PT Assessment  Assessment Comment:  "Patient greatest tenderness along distal ray with third and fourth phalanges extension.  Focused on scar mobilization as well as distal ray mobilization.  Overall progressing well towards goals of ambulating without supportive shoe wear and wearing different shoe wear throughout the day.    Plan:  OP PT Plan  Treatment/Interventions: Dry needling, Manual therapy, Neuromuscular re-education, Self care/ home management, Taping techniques, Therapeutic activities, Therapeutic exercises  PT Plan: Skilled PT  PT Frequency:  (1-2 x a week)  Duration: 10 visits  Onset Date: 24  Certification Period Start Date: 10/09/24  Certification Period End Date: 25  Number of Treatments Authorized:   Rehab Potential: Good    Goals:  Active       PT Problem       PT Goal 1       Start:  10/09/24    Expected End:  25       ST) Patient will improve LEFS score by 9 points in order to perform functional activities at home and in the community in 4 weeks.  2) Patient will be able to complete ADLs with pain less than 1/10 in 4 weeks. L foot typically 0-3/10   R foot 3-5/10 - 50% improved.   3) Pt will improve foot/ankle AROM to equal opposite foot without pain or \"pulling\" to complete ADLs with less difficulty in 4 weeks.   4) Patient will be independent with HEP to allow for continued improvement in daily tasks at home and in the community in 3 visits.   LT) Patient will have strength at intrinsic foot musculature to equal opposite LE to allow pain free barefoot walking at home in 6 weeks. PROGRESSING.  2) Patient will report sensation changes at feet have resolved in order to stand and walk without pain for work and home duties in 6 weeks.   3) Patient will tolerate walk/jog in clinic for eventual return to fitness activities without pain for health management in 6 weeks.    4) Patient will improve LEFS score >/=70/80 points in order to perform functional activities at home and in the community by discharge.  "              Time Calculation  Start Time: 1602  Stop Time: 1645  Time Calculation (min): 43 min  PT Therapeutic Procedures Time Entry  Manual Therapy Time Entry: 40,

## 2025-03-04 ENCOUNTER — TREATMENT (OUTPATIENT)
Dept: PHYSICAL THERAPY | Facility: CLINIC | Age: 45
End: 2025-03-04
Payer: COMMERCIAL

## 2025-03-04 DIAGNOSIS — G57.63 MORTON'S NEUROMA OF BOTH FEET: Primary | ICD-10-CM

## 2025-03-04 DIAGNOSIS — R26.2 DIFFICULTY IN WALKING INVOLVING ANKLE AND FOOT JOINT: ICD-10-CM

## 2025-03-04 PROCEDURE — 97140 MANUAL THERAPY 1/> REGIONS: CPT | Mod: GP | Performed by: PHYSICAL THERAPIST

## 2025-03-04 ASSESSMENT — PAIN - FUNCTIONAL ASSESSMENT: PAIN_FUNCTIONAL_ASSESSMENT: 0-10

## 2025-03-04 ASSESSMENT — PAIN SCALES - GENERAL: PAINLEVEL_OUTOF10: 3

## 2025-03-05 NOTE — PROGRESS NOTES
Physical Therapy Treatment    Patient Name: Fredrick Adamson  MRN: [id number]  Today's Date: 3/4/2025    Current Problem  Problem List Items Addressed This Visit             ICD-10-CM    Mata's neuroma of both feet - Primary G57.63    Difficulty in walking involving ankle and foot joint R26.2       Insurance:  Payor: [facility] / Plan: [facility] / Product Type: *No Product type* /   Number of Treatments Authorized: 2/20  Certification Period Start Date: 10/09/24  Certification Period End Date: 01/07/25    Subjective   General  Reason for Referral: Bilateral foot pain -  s/p neuroma excision  Referred By: Dr. Jarod Wren DPM  Past Medical History Relevant to Rehab: Neuroma excision 5/31/24  General Comment: Patient states that she has had elevated pain in her feet recently. notes not having to wear shoes in the house but had to go back to her old [employer].    Performing HEP?: No    Precautions  Precautions  Precautions Comment: None  Pain  Pain Assessment: 0-10  0-10 (Numeric) Pain Score: 3  Pain Type: Neuropathic pain  Pain Location: Foot    Objective   TTP distal 4/5 rays    Treatments:              Manual Therapy  Manual Therapy Activity 1: Scar tissue mobilization- B  Manual Therapy Activity 2: IASTM to B Plantar foot and posterior tibial  Manual Therapy Activity 3: Grade 3 sub talar distraction and EV tilts      Assessment:  PT Assessment  Assessment Comment: Patient returns to therapy with slightly elevated bilateral foot pain.  Manual therapy resolved left foot pain though patient still has minor pain in right lower extremity.  Educated patient importance of compliance with home exercise program especially soft tissue release with lacrosse ball at the end of the day.    Plan:  OP PT Plan  Treatment/Interventions: Dry needling, Manual therapy, Neuromuscular re-education, Self care/ home management, Taping techniques, Therapeutic activities, Therapeutic exercises  PT Plan: Skilled  "PT  PT Frequency:  (1-2 x a week)  Duration: 10 visits  Onset Date: 24  Certification Period Start Date: 10/09/24  Certification Period End Date: 25  Number of Treatments Authorized:   Rehab Potential: Good    Goals:  Active       PT Problem       PT Goal 1       Start:  10/09/24    Expected End:  25       ST) Patient will improve LEFS score by 9 points in order to perform functional activities at home and in the community in 4 weeks.  2) Patient will be able to complete ADLs with pain less than 1/10 in 4 weeks. L foot typically 0-3/10   R foot 3-5/10 - 50% improved.   3) Pt will improve foot/ankle AROM to equal opposite foot without pain or \"pulling\" to complete ADLs with less difficulty in 4 weeks.   4) Patient will be independent with HEP to allow for continued improvement in daily tasks at home and in the community in 3 visits.   LT) Patient will have strength at intrinsic foot musculature to equal opposite LE to allow pain free barefoot walking at home in 6 weeks. PROGRESSING.  2) Patient will report sensation changes at feet have resolved in order to stand and walk without pain for work and home duties in 6 weeks.   3) Patient will tolerate walk/jog in clinic for eventual return to fitness activities without pain for health management in 6 weeks.    4) Patient will improve LEFS score >/=70/80 points in order to perform functional activities at home and in the community by discharge.               Time Calculation  Start Time: 1605  Stop Time: 1645  Time Calculation (min): 40 min  PT Therapeutic Procedures Time Entry  Manual Therapy Time Entry: 39,    "

## 2025-04-08 ENCOUNTER — TREATMENT (OUTPATIENT)
Dept: PHYSICAL THERAPY | Facility: CLINIC | Age: 45
End: 2025-04-08
Payer: COMMERCIAL

## 2025-04-08 DIAGNOSIS — R26.2 DIFFICULTY IN WALKING INVOLVING ANKLE AND FOOT JOINT: ICD-10-CM

## 2025-04-08 DIAGNOSIS — G57.63 MORTON'S NEUROMA OF BOTH FEET: Primary | ICD-10-CM

## 2025-04-08 PROCEDURE — 97140 MANUAL THERAPY 1/> REGIONS: CPT | Mod: GP | Performed by: PHYSICAL THERAPIST

## 2025-04-08 ASSESSMENT — PAIN SCALES - GENERAL: PAINLEVEL_OUTOF10: 3

## 2025-04-08 ASSESSMENT — PAIN - FUNCTIONAL ASSESSMENT: PAIN_FUNCTIONAL_ASSESSMENT: 0-10

## 2025-04-08 NOTE — PROGRESS NOTES
Physical Therapy Treatment    Patient Name: Fredrick Adamson  MRN: [id number]  Today's Date: 4/8/2025    Current Problem  Problem List Items Addressed This Visit             ICD-10-CM    Mata's neuroma of both feet - Primary G57.63    Difficulty in walking involving ankle and foot joint R26.2       Insurance:  Payor: [facility] / Plan: [facility] / Product Type: *No Product type* /   Number of Treatments Authorized: 3/20 Progress Note  Certification Period Start Date: 10/09/24  Certification Period End Date: 01/07/25    Subjective   General  Reason for Referral: Bilateral foot pain -  s/p neuroma excision  Referred By: Dr. Jarod Wren DPM  Past Medical History Relevant to Rehab: Neuroma excision 5/31/24  General Comment: Patient states that her pain is better than it used to overall. However, after their break, she has had elevated pain at the end of the day.    Performing HEP?: Yes    Precautions  Precautions  Precautions Comment: None  Pain  Pain Assessment: 0-10  0-10 (Numeric) Pain Score: 3  Pain Type: Neuropathic pain  Pain Location: Foot    Objective   ANKLE    Ankle AROM  R ankle dorsiflexion: (10°): 5  L ankle dorsiflexion: (10°): 8  R ankle plantarflexion: (40°): WNL  L ankle plantarflexion: (40°): WNL    Ankle MMT  R ankle dorsiflexion: (5/5): 5-/5  L ankle dorsiflexion: (5/5): 5-/5  R ankle plantarflexion: (5/5): 4+/5  L ankle plantarflexion: (5/5): 4+/5  R ankle eversion: (5/5): 4+/5  L ankle eversion: (5/5): 4+/5    Treatments:              Manual Therapy  Manual Therapy Activity 1: Scar tissue mobilization- B  Manual Therapy Activity 2: IASTM to B Plantar foot and posterior tibial  Manual Therapy Activity 3: Grade 3 sub talar distraction and EV tilts    OP EDUCATION:  Outpatient Education  Education Comment: Educated to continue strengthening and self mobilization    Assessment:  PT Assessment  Assessment Comment: Patient is attended 12 physical therapy visits for  "bilateral foot pain following neuroma removal.  Patient continues to show improvement in pain severity as well as frequency.  However she continues to have lower extremity weakness and increased pain with prolonged standing activities.  Therefore she will require skilled physical therapy to continue to address stated deficits for full return to pain-free function.    Plan:  OP PT Plan  Treatment/Interventions: Dry needling, Manual therapy, Neuromuscular re-education, Self care/ home management, Taping techniques, Therapeutic activities, Therapeutic exercises  PT Plan: Skilled PT  PT Frequency:  (1-2 x a week)  Duration: 10 visits  Onset Date: 24  Certification Period Start Date: 10/09/24  Certification Period End Date: 25  Number of Treatments Authorized: 3/20 Progress Note  Rehab Potential: Good    Goals:  Active       PT Problem       PT Goal 1       Start:  10/09/24    Expected End:  25       ST) Patient will improve LEFS score by 9 points in order to perform functional activities at home and in the community in 4 weeks.  2) Patient will be able to complete ADLs with pain less than 1/10 in 4 weeks. L foot typically 0-3/10   R foot 3-5/10 - 50% improved.   3) Pt will improve foot/ankle AROM to equal opposite foot without pain or \"pulling\" to complete ADLs with less difficulty in 4 weeks.   4) Patient will be independent with HEP to allow for continued improvement in daily tasks at home and in the community in 3 visits.   LT) Patient will have strength at intrinsic foot musculature to equal opposite LE to allow pain free barefoot walking at home in 6 weeks. PROGRESSING.  2) Patient will report sensation changes at feet have resolved in order to stand and walk without pain for work and home duties in 6 weeks.   3) Patient will tolerate walk/jog in clinic for eventual return to fitness activities without pain for health management in 6 weeks.    4) Patient will improve LEFS score >/=70/80 " points in order to perform functional activities at home and in the community by discharge.               Time Calculation  Start Time: 1515  Stop Time: 1600  Time Calculation (min): 45 min  PT Therapeutic Procedures Time Entry  Manual Therapy Time Entry: 41,

## 2025-04-29 ENCOUNTER — APPOINTMENT (OUTPATIENT)
Dept: PHYSICAL THERAPY | Facility: CLINIC | Age: 45
End: 2025-04-29
Payer: COMMERCIAL

## 2025-04-29 DIAGNOSIS — R26.2 DIFFICULTY IN WALKING INVOLVING ANKLE AND FOOT JOINT: ICD-10-CM

## 2025-04-29 DIAGNOSIS — G57.63 MORTON'S NEUROMA OF BOTH FEET: Primary | ICD-10-CM

## 2025-05-20 ENCOUNTER — APPOINTMENT (OUTPATIENT)
Dept: PHYSICAL THERAPY | Facility: CLINIC | Age: 45
End: 2025-05-20
Payer: COMMERCIAL

## 2025-05-20 DIAGNOSIS — R26.2 DIFFICULTY IN WALKING INVOLVING ANKLE AND FOOT JOINT: ICD-10-CM

## 2025-05-20 DIAGNOSIS — G57.63 MORTON'S NEUROMA OF BOTH FEET: Primary | ICD-10-CM

## 2025-06-10 ENCOUNTER — TREATMENT (OUTPATIENT)
Dept: PHYSICAL THERAPY | Facility: CLINIC | Age: 45
End: 2025-06-10
Payer: COMMERCIAL

## 2025-06-10 DIAGNOSIS — G57.63 MORTON'S NEUROMA OF BOTH FEET: Primary | ICD-10-CM

## 2025-06-10 DIAGNOSIS — R26.2 DIFFICULTY IN WALKING INVOLVING ANKLE AND FOOT JOINT: ICD-10-CM

## 2025-06-10 PROCEDURE — 97140 MANUAL THERAPY 1/> REGIONS: CPT | Mod: GP | Performed by: PHYSICAL THERAPIST

## 2025-06-10 ASSESSMENT — PAIN - FUNCTIONAL ASSESSMENT: PAIN_FUNCTIONAL_ASSESSMENT: 0-10

## 2025-06-10 ASSESSMENT — PAIN SCALES - GENERAL: PAINLEVEL_OUTOF10: 2

## 2025-06-10 NOTE — PROGRESS NOTES
Physical Therapy Treatment    Patient Name: Fredrick Adamson  MRN: [id number]  Today's Date: 6/10/2025    Current Problem  Problem List Items Addressed This Visit           ICD-10-CM    Mata's neuroma of both feet - Primary G57.63    Difficulty in walking involving ankle and foot joint R26.2       Insurance:  Payor: [facility] / Plan: [facility] / Product Type: *No Product type* /   Number of Treatments Authorized: 4/20  Certification Period Start Date: 10/09/24  Certification Period End Date: 01/07/25    Subjective   General  Reason for Referral: Bilateral foot pain -  s/p neuroma excision  Referred By: Dr. Jarod Wren DPM  Past Medical History Relevant to Rehab: Neuroma excision 5/31/24  General Comment: Patient states that her feet have not been too bad. Notes she stood yesterday cooking and meal preping and they are more sore.    Performing HEP?: Yes    Precautions  Precautions  Precautions Comment: None  Pain  Pain Assessment: 0-10  0-10 (Numeric) Pain Score: 2  Pain Type: Neuropathic pain  Pain Location: Foot  Pain Orientation: Right    Objective   TTP B plantar fascia    Treatments:              Manual Therapy  Manual Therapy Activity 1: Scar tissue mobilization- B  Manual Therapy Activity 2: IASTM to B Plantar foot and posterior tibial  Manual Therapy Activity 3: Grade 3 sub talar distraction and EV tilts    Assessment:  PT Assessment  Assessment Comment: Patient presenting with reduced overall lower extremity pain.  Continue to progress soft tissue mobility and educate patient on continuing to strengthen at home.  Joint mobility improving in the low flexion remains tender in bilateral lower extremity.  Holding patient's chart at this time will discharge if patient does not return though educated patient if she does need it with return to school due to increased volume to call and schedule at that time.    Plan:  OP PT Plan  Treatment/Interventions: Dry needling, Manual  "therapy, Neuromuscular re-education, Self care/ home management, Taping techniques, Therapeutic activities, Therapeutic exercises  PT Plan: Skilled PT  PT Frequency:  (1-2 x a week)  Duration: 10 visits  Onset Date: 24  Certification Period Start Date: 10/09/24  Certification Period End Date: 25  Number of Treatments Authorized:   Rehab Potential: Good    Goals:  Active       PT Problem       PT Goal 1       Start:  10/09/24    Expected End:  25       ST) Patient will improve LEFS score by 9 points in order to perform functional activities at home and in the community in 4 weeks.  2) Patient will be able to complete ADLs with pain less than 1/10 in 4 weeks. L foot typically 0-3/10   R foot 3-5/10 - 50% improved.   3) Pt will improve foot/ankle AROM to equal opposite foot without pain or \"pulling\" to complete ADLs with less difficulty in 4 weeks.   4) Patient will be independent with HEP to allow for continued improvement in daily tasks at home and in the community in 3 visits.   LT) Patient will have strength at intrinsic foot musculature to equal opposite LE to allow pain free barefoot walking at home in 6 weeks. PROGRESSING.  2) Patient will report sensation changes at feet have resolved in order to stand and walk without pain for work and home duties in 6 weeks.   3) Patient will tolerate walk/jog in clinic for eventual return to fitness activities without pain for health management in 6 weeks.    4) Patient will improve LEFS score >/=70/80 points in order to perform functional activities at home and in the community by discharge.               Time Calculation  Start Time: 945  Stop Time:   Time Calculation (min): 44 min  PT Therapeutic Procedures Time Entry  Manual Therapy Time Entry: 42,    "

## 2025-08-13 ENCOUNTER — DOCUMENTATION (OUTPATIENT)
Dept: PHYSICAL THERAPY | Facility: CLINIC | Age: 45
End: 2025-08-13
Payer: COMMERCIAL

## 2025-08-13 DIAGNOSIS — G57.63 MORTON'S NEUROMA OF BOTH FEET: Primary | ICD-10-CM

## 2025-08-13 DIAGNOSIS — R26.2 DIFFICULTY IN WALKING INVOLVING ANKLE AND FOOT JOINT: ICD-10-CM
